# Patient Record
Sex: FEMALE | Race: WHITE | NOT HISPANIC OR LATINO
[De-identification: names, ages, dates, MRNs, and addresses within clinical notes are randomized per-mention and may not be internally consistent; named-entity substitution may affect disease eponyms.]

---

## 2024-03-02 ENCOUNTER — TRANSCRIPTION ENCOUNTER (OUTPATIENT)
Age: 24
End: 2024-03-02

## 2024-03-02 ENCOUNTER — INPATIENT (INPATIENT)
Facility: HOSPITAL | Age: 24
LOS: 3 days | Discharge: HOME | DRG: 853 | End: 2024-03-06
Attending: SURGERY | Admitting: SURGERY
Payer: COMMERCIAL

## 2024-03-02 ENCOUNTER — RESULT REVIEW (OUTPATIENT)
Age: 24
End: 2024-03-02

## 2024-03-02 VITALS
WEIGHT: 136.91 LBS | SYSTOLIC BLOOD PRESSURE: 92 MMHG | TEMPERATURE: 99 F | HEIGHT: 70 IN | DIASTOLIC BLOOD PRESSURE: 61 MMHG | RESPIRATION RATE: 18 BRPM | HEART RATE: 94 BPM | OXYGEN SATURATION: 100 %

## 2024-03-02 DIAGNOSIS — K35.80 UNSPECIFIED ACUTE APPENDICITIS: ICD-10-CM

## 2024-03-02 LAB
ALBUMIN SERPL ELPH-MCNC: 4.9 G/DL — SIGNIFICANT CHANGE UP (ref 3.3–5)
ALP SERPL-CCNC: 89 U/L — SIGNIFICANT CHANGE UP (ref 40–120)
ALT FLD-CCNC: 18 U/L — SIGNIFICANT CHANGE UP (ref 10–45)
ANION GAP SERPL CALC-SCNC: 15 MMOL/L — SIGNIFICANT CHANGE UP (ref 5–17)
APTT BLD: 29.2 SEC — SIGNIFICANT CHANGE UP (ref 24.5–35.6)
AST SERPL-CCNC: 21 U/L — SIGNIFICANT CHANGE UP (ref 10–40)
BASE EXCESS BLDV CALC-SCNC: 0.3 MMOL/L — SIGNIFICANT CHANGE UP (ref -2–3)
BASE EXCESS BLDV CALC-SCNC: 0.4 MMOL/L — SIGNIFICANT CHANGE UP (ref -2–3)
BASOPHILS # BLD AUTO: 0.04 K/UL — SIGNIFICANT CHANGE UP (ref 0–0.2)
BASOPHILS NFR BLD AUTO: 0.2 % — SIGNIFICANT CHANGE UP (ref 0–2)
BILIRUB SERPL-MCNC: 0.9 MG/DL — SIGNIFICANT CHANGE UP (ref 0.2–1.2)
BUN SERPL-MCNC: 11 MG/DL — SIGNIFICANT CHANGE UP (ref 7–23)
CA-I SERPL-SCNC: 1.23 MMOL/L — SIGNIFICANT CHANGE UP (ref 1.15–1.33)
CA-I SERPL-SCNC: 1.24 MMOL/L — SIGNIFICANT CHANGE UP (ref 1.15–1.33)
CALCIUM SERPL-MCNC: 10.5 MG/DL — SIGNIFICANT CHANGE UP (ref 8.4–10.5)
CHLORIDE BLDV-SCNC: 102 MMOL/L — SIGNIFICANT CHANGE UP (ref 96–108)
CHLORIDE BLDV-SCNC: 102 MMOL/L — SIGNIFICANT CHANGE UP (ref 96–108)
CHLORIDE SERPL-SCNC: 100 MMOL/L — SIGNIFICANT CHANGE UP (ref 96–108)
CO2 BLDV-SCNC: 23 MMOL/L — SIGNIFICANT CHANGE UP (ref 22–26)
CO2 BLDV-SCNC: 26 MMOL/L — SIGNIFICANT CHANGE UP (ref 22–26)
CO2 SERPL-SCNC: 22 MMOL/L — SIGNIFICANT CHANGE UP (ref 22–31)
CREAT SERPL-MCNC: 0.55 MG/DL — SIGNIFICANT CHANGE UP (ref 0.5–1.3)
EGFR: 132 ML/MIN/1.73M2 — SIGNIFICANT CHANGE UP
EOSINOPHIL # BLD AUTO: 0 K/UL — SIGNIFICANT CHANGE UP (ref 0–0.5)
EOSINOPHIL NFR BLD AUTO: 0 % — SIGNIFICANT CHANGE UP (ref 0–6)
GAS PNL BLDV: 133 MMOL/L — LOW (ref 136–145)
GAS PNL BLDV: 134 MMOL/L — LOW (ref 136–145)
GAS PNL BLDV: SIGNIFICANT CHANGE UP
GLUCOSE BLDV-MCNC: 111 MG/DL — HIGH (ref 70–99)
GLUCOSE BLDV-MCNC: 129 MG/DL — HIGH (ref 70–99)
GLUCOSE SERPL-MCNC: 136 MG/DL — HIGH (ref 70–99)
HCG SERPL-ACNC: <2 MIU/ML — SIGNIFICANT CHANGE UP
HCO3 BLDV-SCNC: 22 MMOL/L — SIGNIFICANT CHANGE UP (ref 22–29)
HCO3 BLDV-SCNC: 25 MMOL/L — SIGNIFICANT CHANGE UP (ref 22–29)
HCT VFR BLD CALC: 36.9 % — SIGNIFICANT CHANGE UP (ref 34.5–45)
HCT VFR BLDA CALC: 33 % — LOW (ref 34.5–46.5)
HCT VFR BLDA CALC: 38 % — SIGNIFICANT CHANGE UP (ref 34.5–46.5)
HGB BLD CALC-MCNC: 11 G/DL — LOW (ref 11.7–16.1)
HGB BLD CALC-MCNC: 12.6 G/DL — SIGNIFICANT CHANGE UP (ref 11.7–16.1)
HGB BLD-MCNC: 12.4 G/DL — SIGNIFICANT CHANGE UP (ref 11.5–15.5)
HOROWITZ INDEX BLDV+IHG-RTO: SIGNIFICANT CHANGE UP
IMM GRANULOCYTES NFR BLD AUTO: 0.3 % — SIGNIFICANT CHANGE UP (ref 0–0.9)
INR BLD: 1.24 RATIO — HIGH (ref 0.85–1.18)
LACTATE BLDV-MCNC: 1.2 MMOL/L — SIGNIFICANT CHANGE UP (ref 0.5–2)
LACTATE BLDV-MCNC: 2.5 MMOL/L — HIGH (ref 0.5–2)
LIDOCAIN IGE QN: 14 U/L — SIGNIFICANT CHANGE UP (ref 7–60)
LYMPHOCYTES # BLD AUTO: 1.5 K/UL — SIGNIFICANT CHANGE UP (ref 1–3.3)
LYMPHOCYTES # BLD AUTO: 7 % — LOW (ref 13–44)
MCHC RBC-ENTMCNC: 28.4 PG — SIGNIFICANT CHANGE UP (ref 27–34)
MCHC RBC-ENTMCNC: 33.6 GM/DL — SIGNIFICANT CHANGE UP (ref 32–36)
MCV RBC AUTO: 84.6 FL — SIGNIFICANT CHANGE UP (ref 80–100)
MONOCYTES # BLD AUTO: 1.43 K/UL — HIGH (ref 0–0.9)
MONOCYTES NFR BLD AUTO: 6.7 % — SIGNIFICANT CHANGE UP (ref 2–14)
NEUTROPHILS # BLD AUTO: 18.34 K/UL — HIGH (ref 1.8–7.4)
NEUTROPHILS NFR BLD AUTO: 85.8 % — HIGH (ref 43–77)
NRBC # BLD: 0 /100 WBCS — SIGNIFICANT CHANGE UP (ref 0–0)
PCO2 BLDV: 28 MMHG — LOW (ref 39–42)
PCO2 BLDV: 37 MMHG — LOW (ref 39–42)
PH BLDV: 7.43 — SIGNIFICANT CHANGE UP (ref 7.32–7.43)
PH BLDV: 7.51 — HIGH (ref 7.32–7.43)
PLATELET # BLD AUTO: 308 K/UL — SIGNIFICANT CHANGE UP (ref 150–400)
PO2 BLDV: 40 MMHG — SIGNIFICANT CHANGE UP (ref 25–45)
PO2 BLDV: 73 MMHG — HIGH (ref 25–45)
POTASSIUM BLDV-SCNC: 3.5 MMOL/L — SIGNIFICANT CHANGE UP (ref 3.5–5.1)
POTASSIUM BLDV-SCNC: 3.7 MMOL/L — SIGNIFICANT CHANGE UP (ref 3.5–5.1)
POTASSIUM SERPL-MCNC: 3.9 MMOL/L — SIGNIFICANT CHANGE UP (ref 3.5–5.3)
POTASSIUM SERPL-SCNC: 3.9 MMOL/L — SIGNIFICANT CHANGE UP (ref 3.5–5.3)
PROT SERPL-MCNC: 7.9 G/DL — SIGNIFICANT CHANGE UP (ref 6–8.3)
PROTHROM AB SERPL-ACNC: 12.9 SEC — SIGNIFICANT CHANGE UP (ref 9.5–13)
RBC # BLD: 4.36 M/UL — SIGNIFICANT CHANGE UP (ref 3.8–5.2)
RBC # FLD: 12.7 % — SIGNIFICANT CHANGE UP (ref 10.3–14.5)
SAO2 % BLDV: 78.9 % — SIGNIFICANT CHANGE UP (ref 67–88)
SAO2 % BLDV: 96.6 % — HIGH (ref 67–88)
SODIUM SERPL-SCNC: 137 MMOL/L — SIGNIFICANT CHANGE UP (ref 135–145)
WBC # BLD: 21.38 K/UL — HIGH (ref 3.8–10.5)
WBC # FLD AUTO: 21.38 K/UL — HIGH (ref 3.8–10.5)

## 2024-03-02 PROCEDURE — 99285 EMERGENCY DEPT VISIT HI MDM: CPT

## 2024-03-02 PROCEDURE — 88304 TISSUE EXAM BY PATHOLOGIST: CPT | Mod: 26

## 2024-03-02 PROCEDURE — 74177 CT ABD & PELVIS W/CONTRAST: CPT | Mod: 26,MC

## 2024-03-02 RX ORDER — PIPERACILLIN AND TAZOBACTAM 4; .5 G/20ML; G/20ML
3.38 INJECTION, POWDER, LYOPHILIZED, FOR SOLUTION INTRAVENOUS EVERY 8 HOURS
Refills: 0 | Status: DISCONTINUED | OUTPATIENT
Start: 2024-03-03 | End: 2024-03-03

## 2024-03-02 RX ORDER — ONDANSETRON 8 MG/1
4 TABLET, FILM COATED ORAL ONCE
Refills: 0 | Status: COMPLETED | OUTPATIENT
Start: 2024-03-02 | End: 2024-03-02

## 2024-03-02 RX ORDER — ACETAMINOPHEN 500 MG
1000 TABLET ORAL EVERY 6 HOURS
Refills: 0 | Status: DISCONTINUED | OUTPATIENT
Start: 2024-03-02 | End: 2024-03-03

## 2024-03-02 RX ORDER — HYDROMORPHONE HYDROCHLORIDE 2 MG/ML
0.5 INJECTION INTRAMUSCULAR; INTRAVENOUS; SUBCUTANEOUS EVERY 4 HOURS
Refills: 0 | Status: DISCONTINUED | OUTPATIENT
Start: 2024-03-02 | End: 2024-03-03

## 2024-03-02 RX ORDER — SODIUM CHLORIDE 9 MG/ML
1000 INJECTION, SOLUTION INTRAVENOUS ONCE
Refills: 0 | Status: COMPLETED | OUTPATIENT
Start: 2024-03-02 | End: 2024-03-02

## 2024-03-02 RX ORDER — PIPERACILLIN AND TAZOBACTAM 4; .5 G/20ML; G/20ML
3.38 INJECTION, POWDER, LYOPHILIZED, FOR SOLUTION INTRAVENOUS ONCE
Refills: 0 | Status: COMPLETED | OUTPATIENT
Start: 2024-03-02 | End: 2024-03-02

## 2024-03-02 RX ORDER — ENOXAPARIN SODIUM 100 MG/ML
40 INJECTION SUBCUTANEOUS EVERY 24 HOURS
Refills: 0 | Status: DISCONTINUED | OUTPATIENT
Start: 2024-03-03 | End: 2024-03-03

## 2024-03-02 RX ORDER — SODIUM CHLORIDE 9 MG/ML
1000 INJECTION, SOLUTION INTRAVENOUS
Refills: 0 | Status: DISCONTINUED | OUTPATIENT
Start: 2024-03-02 | End: 2024-03-03

## 2024-03-02 RX ORDER — ACETAMINOPHEN 500 MG
1000 TABLET ORAL ONCE
Refills: 0 | Status: COMPLETED | OUTPATIENT
Start: 2024-03-02 | End: 2024-03-02

## 2024-03-02 RX ADMIN — SODIUM CHLORIDE 1000 MILLILITER(S): 9 INJECTION, SOLUTION INTRAVENOUS at 21:55

## 2024-03-02 RX ADMIN — ONDANSETRON 4 MILLIGRAM(S): 8 TABLET, FILM COATED ORAL at 21:32

## 2024-03-02 RX ADMIN — Medication 400 MILLIGRAM(S): at 21:31

## 2024-03-02 RX ADMIN — PIPERACILLIN AND TAZOBACTAM 200 GRAM(S): 4; .5 INJECTION, POWDER, LYOPHILIZED, FOR SOLUTION INTRAVENOUS at 21:54

## 2024-03-02 RX ADMIN — SODIUM CHLORIDE 1000 MILLILITER(S): 9 INJECTION, SOLUTION INTRAVENOUS at 22:32

## 2024-03-02 NOTE — H&P ADULT - ATTENDING COMMENTS
Pt with acute abd pain and CT c/w perforated appendicitis with multiple appendicoliths. I recommended emergency lap appy, poss open and discussed details of the procedure, R/B/A and expectations of recovery with pt and mother.

## 2024-03-02 NOTE — ED ADULT NURSE NOTE - NS ED NOTE  TALK SOMEONE YN
"Group Therapy Documentation    PATIENT'S NAME: Madhav Oropeza  MRN:   [id number]  :   2005  ACCT. NUMBER: [id number]  DATE OF SERVICE: 23  START TIME:  8:30 AM  END TIME:  9:00 AM  FACILITATOR(S): Hailey Fields LADC  TOPIC: BEH Group Therapy  Number of patients attending the group:  11  Group Length:  0.5 Hours    Dimensions addressed 3, 4, 5, and 6    Summary of Group / Topics Discussed:    Group Therapy/Process Group:  Community Group  Patient completed diary card ratings for the last 24 hours including emotions, safety concerns, substance use, treatment interfering behaviors, and use of DBT skills.  Patient checked in regarding the previous evening as well as progress on treatment goals.    Patient Session Goals / Objectives:  * Patient will increase awareness of emotions and ability to identify them  * Patient will report substance use and safety concerns   * Patient will increase use of DBT skills      Group Attendance:  Attended group session  Interactive Complexity: No    Patient's response to the group topic/interactions:  cooperative with task    Patient appeared to be Actively participating.       Client specific details:  Client checked in as feeling \"angry and content\". Client reported using DBT skills \"self sooth and cope ahead\". Client asked for time to process and stated their treatment goal is to stage up. Client denied urges to use. Diary Card Ratings:  Self-harm thoughts: 1  Action:  No.  Suicide ideation: 0 Action:  No.         " No

## 2024-03-02 NOTE — ED PROVIDER NOTE - CADM POA CENTRAL LINE
-- DO NOT REPLY / DO NOT REPLY ALL --  -- Message is from Engagement Center Operations (ECO) --    General Patient Message   Doctor  ?Emanuel Tucker MD, Sony  Nurse Chloé   MRN [id number]  Tera   Returning call from a call at 3:59 yesterday     Caller Information       Type Contact Phone/Fax    11/22/2022 09:05 AM CST Phone (Incoming) Tera Almanza (Self) [phone] (M)        Alternative phone number: none     Can a detailed message be left? {Yes=1 or No:[id number]::\"No\"}    Message Turnaround: {Is the call about an [place], [facility] or [facility] practice site?:[id number]}              
No

## 2024-03-02 NOTE — ED PROVIDER NOTE - PHYSICAL EXAMINATION
Darrion Da Silva DO (PGY2)   Physical Exam:    Gen: NAD, AOx3  Head: NCAT  HEENT: EOMI, PEERLA  Lung: CTAB, no respiratory distress, no wheezes/rhonchi/rales B/L  CV: RRR, no murmurs, rubs or gallops  Abd: Right lower quadrant tenderness to palpation, no guarding no rigidity or rebound tenderness.  No CVA tenderness bilaterally  MSK: no visible deformities, ROM normal in UE/LE, no back pain  Neuro: No focal sensory or motor deficits. Sensation intact to light touch all extremities.  Skin: Warm, well perfused, no rash, no leg swelling

## 2024-03-02 NOTE — ED PROVIDER NOTE - PROGRESS NOTE DETAILS
Darrion Da Silva DO (PGY2)  Spoke with Dr. Sin - who called ab patient. Discussed prelim read of CT scan of concerns for appendicitis with perforation. Reached out to patient PMD Dr. Bosch at number he provided with no response - left a message. Awaiting final read and surgery re-eval. Darrion Da Silva DO (PGY2)  Spoke with Dr. Sin - who called ab patient. Discussed prelim read of CT scan of concerns for appendicitis with perforation. Reached out to patient PMD Dr. Bosch at number he provided with no response - left a message. Patient and mother made aware of preliminary CT read - Awaiting final read and surgery re-eval. Darrion Da Silva DO (PGY2)  surg resident at bedside. spoke with radiology team regarding CT showing appendicitis. patient to be admitted to surgery

## 2024-03-02 NOTE — H&P ADULT - NSHPPHYSICALEXAM_GEN_ALL_CORE
Physical Examination:  GEN: NAD, resting quietly  NEURO: AAOx3, CN II-XII grossly intact, no focal deficits  PULM: symmetric chest rise bilaterally, no increased WOB  ABD: soft, tender to palpation in the RLQ with focal rebound, no guarding, referred pain from suprapubic to RLQ (+Rovsing), nondistended  EXTR: no lower extremity edema, moving all extremities

## 2024-03-02 NOTE — H&P ADULT - NSHPLABSRESULTS_GEN_ALL_CORE
----------  LABORATORY DATA:  ----------                        12.4   21.38 )-----------( 308      ( 02 Mar 2024 21:34 )             36.9               03-02    137  |  100  |  11  ----------------------------<  136<H>  3.9   |  22  |  0.55    Ca    10.5      02 Mar 2024 21:34  Phos  2.8     03-02  Mg     2.1     03-02    TPro  7.9  /  Alb  4.9  /  TBili  0.9  /  DBili  x   /  AST  21  /  ALT  18  /  AlkPhos  89  03-02    LIVER FUNCTIONS - ( 02 Mar 2024 21:34 )  Alb: 4.9 g/dL / Pro: 7.9 g/dL / ALK PHOS: 89 U/L / ALT: 18 U/L / AST: 21 U/L / GGT: x           PT/INR - ( 02 Mar 2024 21:34 )   PT: 12.9 sec;   INR: 1.24 ratio         PTT - ( 02 Mar 2024 21:34 )  PTT:29.2 sec  Urinalysis Basic - ( 02 Mar 2024 21:34 )    Color: x / Appearance: x / SG: x / pH: x  Gluc: 136 mg/dL / Ketone: x  / Bili: x / Urobili: x   Blood: x / Protein: x / Nitrite: x   Leuk Esterase: x / RBC: x / WBC x   Sq Epi: x / Non Sq Epi: x / Bacteria: x    ----------  RADIOGRAPHIC DATA:  ---------  < from: CT Abdomen and Pelvis w/ IV Cont (03.02.24 @ 22:08) >      FINDINGS:  LOWER CHEST: Within normal limits.    LIVER: Focal fatty infiltration adjacent to the falciform ligament.  BILE DUCTS: Normal caliber.  GALLBLADDER: Within normal limits.  SPLEEN: Within normal limits.  PANCREAS: Within normal limits.  ADRENALS: Within normal limits.  KIDNEYS/URETERS: Within normal limits.    BLADDER: Within normal limits.  REPRODUCTIVE ORGANS: Uterus and adnexa within normal limits.    BOWEL: No bowel obstruction. The appendix is retrocecal in location and   courses cephalad towards the right kidney, with marked surrounding   inflammation, fluid and thickening of the right peritoneal reflections.   The appendix is dilated up to 1.5 cm with surrounding wall enhancement.    Areas of absent enhancement anteriorly, walldefect seen on series 301:63   and 602:51, suggests gangrene and perforation.  There are prominent   appendicoliths proximally, measuring 1.3 cm, 0.6 cm, and 0.5 cm.    PERITONEUM: Right lower quadrant ascites as above  VESSELS: Within normal limits.  RETROPERITONEUM/LYMPH NODES: No lymphadenopathy.  ABDOMINAL WALL: Within normal limits.  BONES: Lumbar levoscoliosis.    IMPRESSION:    Acute appendicitis, most likely complicated by perforation and foci of   gangrene. Multiple appendicoliths in theproximal appendix measure up to   1.3 cm. Marked right sided inflammation and peritoneal hyperenhancement   and thickening suggests regional peritonitis and adhesions. No   significant cecal base thickening.    Findings discussed with Dr. Da Silva at 11:10 pm on 3/2/24 with RBV.    --- End of Report ---    < end of copied text >

## 2024-03-02 NOTE — H&P ADULT - ASSESSMENT
Ms. Ley is a 23 year old woman with a PMH of multiple R hip orthopedic surgeries who presents with 1 day of RLQ pain, nausea, and emesis, found to have leukocytosis and imaging consistent with acute perforated appendicitis.     Plan:  - admit to Dr. Ruff  - NPO/IVf  - OR for emergent appendectomy  - consented  - continue zosyn  - holding home Vyvanse, Lexapro whilst NPO  -DVT ppx in AM    Aaron Schmid, PGY3  [place] [facility]   [id number]

## 2024-03-02 NOTE — ED PROVIDER NOTE - CLINICAL SUMMARY MEDICAL DECISION MAKING FREE TEXT BOX
23-year-old female history of anxiety and ADHD on Lexapro and Vyvanse presenting with right lower quadrant abdominal pain.  Patient states she has been vomiting nonbloody in nature since last night and began to have constant right lower quadrant abdominal pain.  Denies any urinary complaint such as dysuria or hematuria.  Denies any vaginal discharge.  States she is currently on her menstrual period.   Vital signs remarkable for temperature of 99.3, not hypoxic, not tachycardic.  Right lower quadrant tenderness on exam.  Will pursue basic labs, VBG with lactate, hCG, coags, presurgical labs, symptomatic control with Zofran and fluids  Diagnosis includes but not limited to appendicitis versus viral gastroenteritis versus infectious etiology.

## 2024-03-02 NOTE — ED PROVIDER NOTE - OBJECTIVE STATEMENT
23-year-old female history of anxiety and ADHD on Lexapro and Vyvanse presenting with right lower quadrant abdominal pain.  Patient states she has been vomiting nonbloody in nature since last night and began to have constant right lower quadrant abdominal pain.  Denies any urinary complaint such as dysuria or hematuria.  Denies any vaginal discharge.  States she is currently on her menstrual period.  States subjective chills without fever, no URI symptoms, chest pain, shortness of breath. Accompanied by mother at bedside.  States she is not sexually active.  No illicit drug use.

## 2024-03-02 NOTE — ED ADULT NURSE NOTE - OBJECTIVE STATEMENT
23y female presents to ED w/ abdominal pain. Pt states she had an episode of vomiting yesterday and shortly after developed right lower abdominal pain. Pt states the pain has been constant since its onset and was sent by MD to ED to rule out appendicitis. Pt abdominal is soft non distended. Tender on palpation to RLQ. Pt denies fever, chills, diarrhea, urinary symptoms, dizziness, lightheadedness, vaginal bleeding. Pt ambulatory.

## 2024-03-02 NOTE — H&P ADULT - HISTORY OF PRESENT ILLNESS
Ms. Ley is a 23 year old woman with a PMH of multiple R hip orthopedic surgeries who presents with 1 day of RLQ pain, nausea, and emesis. She reports that last night she became nauseated and numerous episodes of nonbloody nonbilious emesis throughout the night into today. She also noted RLQ pain that was unrelenting but could not necessarily describe if it was crampy or sharp. She did note some subjective chills wihtout measuring fever. She did have her regular period starting around 2 days ago, associated with normal amount of flow. She is regular and typically lasts about 6 days. she denies being sexually active in the past few weeks and denies any history of STI or recent infections of any kind. In the ED the patient is afebrile, mildly hypotensive, and normocardic. Labs were significant for a leukocytosis to 21 and CT demonstrates acute perforated appendicitis.

## 2024-03-02 NOTE — ED PROVIDER NOTE - ATTENDING CONTRIBUTION TO CARE
Attending MD Brar: I personally have seen and examined this patient.  Resident note reviewed and agree on plan of care and except where noted.  See below for details.     seen in [facility], accompanied by mother    23F with PMH/PSH including anxiety on Lexapro, ADHD on Vyvanse, s/p multiple R hip surgeries presents to the ED with nausea, vomiting, abdominal pain.  Reports that began having nonbloody, nonbilious emesis last night.  Reports RLQ abdominal pain, worse today.  Reports had a doctor go to her home and was sent in for concern for appendicitis.  Denies bloody or black stools.  Reports having BM.  Reports is currently menstruating.  Denies vaginal discharge.  Denies sexually active, denies tobacco, drugs, EtOH.  Denies chest pain, shortness of breath.  Reports chills.      Exam:   General: NAD  HENT: head NCAT, airway patent  Eyes: anicteric, no conjunctival injection   Lungs: lungs CTAB with good inspiratory effort, no wheezing, no rhonchi, no rales  Cardiac: +S1S2, no obvious m/r/g  GI: abdomen soft with +BS, +tenderness to palpation in RLQ, +McBurneys, neg Rovsings, no rebound, no guarding, ND  : no CVAT  MSK: ranging neck and extremities freely  Neuro: moving all extremities spontaneously, nonfocal  Psych: very anxious    A/P: 23F with RLQ abdominal pain, nausea, vomiting, DDx includes appendicitis, cecitis, will also consider ovarian pathology such as ovarian cyst rupture, will obtain CTAP, will obtain labs for metabolic derangement, will give IVFs, will keep npo, will give IV abx as needed, will consult sx as needed.  Patient and mother amenable to plan.

## 2024-03-03 LAB
ANION GAP SERPL CALC-SCNC: 10 MMOL/L — SIGNIFICANT CHANGE UP (ref 5–17)
ANION GAP SERPL CALC-SCNC: 11 MMOL/L — SIGNIFICANT CHANGE UP (ref 5–17)
BASOPHILS # BLD AUTO: 0.02 K/UL — SIGNIFICANT CHANGE UP (ref 0–0.2)
BASOPHILS NFR BLD AUTO: 0.1 % — SIGNIFICANT CHANGE UP (ref 0–2)
BUN SERPL-MCNC: 7 MG/DL — SIGNIFICANT CHANGE UP (ref 7–23)
BUN SERPL-MCNC: 9 MG/DL — SIGNIFICANT CHANGE UP (ref 7–23)
CALCIUM SERPL-MCNC: 8.2 MG/DL — LOW (ref 8.4–10.5)
CALCIUM SERPL-MCNC: 8.8 MG/DL — SIGNIFICANT CHANGE UP (ref 8.4–10.5)
CHLORIDE SERPL-SCNC: 102 MMOL/L — SIGNIFICANT CHANGE UP (ref 96–108)
CHLORIDE SERPL-SCNC: 103 MMOL/L — SIGNIFICANT CHANGE UP (ref 96–108)
CO2 SERPL-SCNC: 24 MMOL/L — SIGNIFICANT CHANGE UP (ref 22–31)
CO2 SERPL-SCNC: 25 MMOL/L — SIGNIFICANT CHANGE UP (ref 22–31)
CREAT SERPL-MCNC: 0.49 MG/DL — LOW (ref 0.5–1.3)
CREAT SERPL-MCNC: 0.57 MG/DL — SIGNIFICANT CHANGE UP (ref 0.5–1.3)
EGFR: 131 ML/MIN/1.73M2 — SIGNIFICANT CHANGE UP
EGFR: 136 ML/MIN/1.73M2 — SIGNIFICANT CHANGE UP
EOSINOPHIL # BLD AUTO: 0 K/UL — SIGNIFICANT CHANGE UP (ref 0–0.5)
EOSINOPHIL NFR BLD AUTO: 0 % — SIGNIFICANT CHANGE UP (ref 0–6)
GLUCOSE SERPL-MCNC: 149 MG/DL — HIGH (ref 70–99)
GLUCOSE SERPL-MCNC: 159 MG/DL — HIGH (ref 70–99)
HCT VFR BLD CALC: 30.6 % — LOW (ref 34.5–45)
HCT VFR BLD CALC: 31.6 % — LOW (ref 34.5–45)
HGB BLD-MCNC: 10.1 G/DL — LOW (ref 11.5–15.5)
HGB BLD-MCNC: 10.3 G/DL — LOW (ref 11.5–15.5)
IMM GRANULOCYTES NFR BLD AUTO: 0.2 % — SIGNIFICANT CHANGE UP (ref 0–0.9)
LYMPHOCYTES # BLD AUTO: 0.52 K/UL — LOW (ref 1–3.3)
LYMPHOCYTES # BLD AUTO: 3.9 % — LOW (ref 13–44)
MAGNESIUM SERPL-MCNC: 1.9 MG/DL — SIGNIFICANT CHANGE UP (ref 1.6–2.6)
MAGNESIUM SERPL-MCNC: 1.9 MG/DL — SIGNIFICANT CHANGE UP (ref 1.6–2.6)
MCHC RBC-ENTMCNC: 28.3 PG — SIGNIFICANT CHANGE UP (ref 27–34)
MCHC RBC-ENTMCNC: 28.9 PG — SIGNIFICANT CHANGE UP (ref 27–34)
MCHC RBC-ENTMCNC: 32 GM/DL — SIGNIFICANT CHANGE UP (ref 32–36)
MCHC RBC-ENTMCNC: 33.7 GM/DL — SIGNIFICANT CHANGE UP (ref 32–36)
MCV RBC AUTO: 85.7 FL — SIGNIFICANT CHANGE UP (ref 80–100)
MCV RBC AUTO: 88.5 FL — SIGNIFICANT CHANGE UP (ref 80–100)
MONOCYTES # BLD AUTO: 0.92 K/UL — HIGH (ref 0–0.9)
MONOCYTES NFR BLD AUTO: 6.8 % — SIGNIFICANT CHANGE UP (ref 2–14)
NEUTROPHILS # BLD AUTO: 12 K/UL — HIGH (ref 1.8–7.4)
NEUTROPHILS NFR BLD AUTO: 89 % — HIGH (ref 43–77)
NRBC # BLD: 0 /100 WBCS — SIGNIFICANT CHANGE UP (ref 0–0)
NRBC # BLD: 0 /100 WBCS — SIGNIFICANT CHANGE UP (ref 0–0)
PHOSPHATE SERPL-MCNC: 3.2 MG/DL — SIGNIFICANT CHANGE UP (ref 2.5–4.5)
PHOSPHATE SERPL-MCNC: 3.7 MG/DL — SIGNIFICANT CHANGE UP (ref 2.5–4.5)
PLATELET # BLD AUTO: 230 K/UL — SIGNIFICANT CHANGE UP (ref 150–400)
PLATELET # BLD AUTO: 232 K/UL — SIGNIFICANT CHANGE UP (ref 150–400)
POTASSIUM SERPL-MCNC: 3.8 MMOL/L — SIGNIFICANT CHANGE UP (ref 3.5–5.3)
POTASSIUM SERPL-MCNC: 4 MMOL/L — SIGNIFICANT CHANGE UP (ref 3.5–5.3)
POTASSIUM SERPL-SCNC: 3.8 MMOL/L — SIGNIFICANT CHANGE UP (ref 3.5–5.3)
POTASSIUM SERPL-SCNC: 4 MMOL/L — SIGNIFICANT CHANGE UP (ref 3.5–5.3)
RBC # BLD: 3.57 M/UL — LOW (ref 3.8–5.2)
RBC # BLD: 3.57 M/UL — LOW (ref 3.8–5.2)
RBC # FLD: 12.8 % — SIGNIFICANT CHANGE UP (ref 10.3–14.5)
RBC # FLD: 13.1 % — SIGNIFICANT CHANGE UP (ref 10.3–14.5)
SODIUM SERPL-SCNC: 137 MMOL/L — SIGNIFICANT CHANGE UP (ref 135–145)
SODIUM SERPL-SCNC: 138 MMOL/L — SIGNIFICANT CHANGE UP (ref 135–145)
WBC # BLD: 12.74 K/UL — HIGH (ref 3.8–10.5)
WBC # BLD: 13.49 K/UL — HIGH (ref 3.8–10.5)
WBC # FLD AUTO: 12.74 K/UL — HIGH (ref 3.8–10.5)
WBC # FLD AUTO: 13.49 K/UL — HIGH (ref 3.8–10.5)

## 2024-03-03 PROCEDURE — 99223 1ST HOSP IP/OBS HIGH 75: CPT | Mod: 57

## 2024-03-03 PROCEDURE — 44970 LAPAROSCOPY APPENDECTOMY: CPT

## 2024-03-03 PROCEDURE — 93010 ELECTROCARDIOGRAM REPORT: CPT

## 2024-03-03 DEVICE — STAPLER COVIDIEN TRI-STAPLE 45MM TAN RELOAD: Type: IMPLANTABLE DEVICE | Status: FUNCTIONAL

## 2024-03-03 DEVICE — CLIP APPLIER COVIDIEN ENDOCLIP II 10MM MED/LG: Type: IMPLANTABLE DEVICE | Status: FUNCTIONAL

## 2024-03-03 DEVICE — STAPLER COVIDIEN TRI-STAPLE 60MM PURPLE RELOAD: Type: IMPLANTABLE DEVICE | Status: FUNCTIONAL

## 2024-03-03 DEVICE — VISTASEAL FIBRIN HUMAN 4ML: Type: IMPLANTABLE DEVICE | Status: FUNCTIONAL

## 2024-03-03 RX ORDER — HYDROMORPHONE HYDROCHLORIDE 2 MG/ML
0.5 INJECTION INTRAMUSCULAR; INTRAVENOUS; SUBCUTANEOUS EVERY 4 HOURS
Refills: 0 | Status: DISCONTINUED | OUTPATIENT
Start: 2024-03-03 | End: 2024-03-04

## 2024-03-03 RX ORDER — LANOLIN ALCOHOL/MO/W.PET/CERES
3 CREAM (GRAM) TOPICAL AT BEDTIME
Refills: 0 | Status: DISCONTINUED | OUTPATIENT
Start: 2024-03-03 | End: 2024-03-03

## 2024-03-03 RX ORDER — ONDANSETRON 8 MG/1
4 TABLET, FILM COATED ORAL ONCE
Refills: 0 | Status: DISCONTINUED | OUTPATIENT
Start: 2024-03-03 | End: 2024-03-03

## 2024-03-03 RX ORDER — HYDROMORPHONE HYDROCHLORIDE 2 MG/ML
0.5 INJECTION INTRAMUSCULAR; INTRAVENOUS; SUBCUTANEOUS
Refills: 0 | Status: DISCONTINUED | OUTPATIENT
Start: 2024-03-03 | End: 2024-03-03

## 2024-03-03 RX ORDER — INFLUENZA VIRUS VACCINE 15; 15; 15; 15 UG/.5ML; UG/.5ML; UG/.5ML; UG/.5ML
0.5 SUSPENSION INTRAMUSCULAR ONCE
Refills: 0 | Status: DISCONTINUED | OUTPATIENT
Start: 2024-03-03 | End: 2024-03-06

## 2024-03-03 RX ORDER — HYDROMORPHONE HYDROCHLORIDE 2 MG/ML
0.25 INJECTION INTRAMUSCULAR; INTRAVENOUS; SUBCUTANEOUS ONCE
Refills: 0 | Status: DISCONTINUED | OUTPATIENT
Start: 2024-03-03 | End: 2024-03-03

## 2024-03-03 RX ORDER — SODIUM CHLORIDE 9 MG/ML
1000 INJECTION, SOLUTION INTRAVENOUS
Refills: 0 | Status: DISCONTINUED | OUTPATIENT
Start: 2024-03-03 | End: 2024-03-05

## 2024-03-03 RX ORDER — SODIUM CHLORIDE 9 MG/ML
1000 INJECTION, SOLUTION INTRAVENOUS
Refills: 0 | Status: DISCONTINUED | OUTPATIENT
Start: 2024-03-03 | End: 2024-03-03

## 2024-03-03 RX ORDER — ESCITALOPRAM OXALATE 10 MG/1
40 TABLET, FILM COATED ORAL DAILY
Refills: 0 | Status: DISCONTINUED | OUTPATIENT
Start: 2024-03-03 | End: 2024-03-06

## 2024-03-03 RX ORDER — SODIUM CHLORIDE 9 MG/ML
500 INJECTION, SOLUTION INTRAVENOUS ONCE
Refills: 0 | Status: COMPLETED | OUTPATIENT
Start: 2024-03-03 | End: 2024-03-03

## 2024-03-03 RX ORDER — KETOROLAC TROMETHAMINE 30 MG/ML
15 SYRINGE (ML) INJECTION EVERY 8 HOURS
Refills: 0 | Status: DISCONTINUED | OUTPATIENT
Start: 2024-03-03 | End: 2024-03-06

## 2024-03-03 RX ORDER — KETOROLAC TROMETHAMINE 30 MG/ML
30 SYRINGE (ML) INJECTION EVERY 6 HOURS
Refills: 0 | Status: DISCONTINUED | OUTPATIENT
Start: 2024-03-03 | End: 2024-03-03

## 2024-03-03 RX ORDER — LANOLIN ALCOHOL/MO/W.PET/CERES
5 CREAM (GRAM) TOPICAL AT BEDTIME
Refills: 0 | Status: DISCONTINUED | OUTPATIENT
Start: 2024-03-03 | End: 2024-03-06

## 2024-03-03 RX ORDER — ACETAMINOPHEN 500 MG
1000 TABLET ORAL EVERY 6 HOURS
Refills: 0 | Status: DISCONTINUED | OUTPATIENT
Start: 2024-03-03 | End: 2024-03-06

## 2024-03-03 RX ORDER — ACETAMINOPHEN 500 MG
1000 TABLET ORAL EVERY 6 HOURS
Refills: 0 | Status: DISCONTINUED | OUTPATIENT
Start: 2024-03-03 | End: 2024-03-03

## 2024-03-03 RX ORDER — LISDEXAMFETAMINE DIMESYLATE 70 MG/1
60 CAPSULE ORAL
Refills: 0 | Status: DISCONTINUED | OUTPATIENT
Start: 2024-03-03 | End: 2024-03-06

## 2024-03-03 RX ORDER — PIPERACILLIN AND TAZOBACTAM 4; .5 G/20ML; G/20ML
3.38 INJECTION, POWDER, LYOPHILIZED, FOR SOLUTION INTRAVENOUS EVERY 8 HOURS
Refills: 0 | Status: DISCONTINUED | OUTPATIENT
Start: 2024-03-03 | End: 2024-03-06

## 2024-03-03 RX ORDER — ENOXAPARIN SODIUM 100 MG/ML
40 INJECTION SUBCUTANEOUS EVERY 24 HOURS
Refills: 0 | Status: DISCONTINUED | OUTPATIENT
Start: 2024-03-03 | End: 2024-03-06

## 2024-03-03 RX ORDER — HYDROMORPHONE HYDROCHLORIDE 2 MG/ML
1 INJECTION INTRAMUSCULAR; INTRAVENOUS; SUBCUTANEOUS
Refills: 0 | Status: DISCONTINUED | OUTPATIENT
Start: 2024-03-03 | End: 2024-03-03

## 2024-03-03 RX ADMIN — Medication 1000 MILLIGRAM(S): at 10:00

## 2024-03-03 RX ADMIN — HYDROMORPHONE HYDROCHLORIDE 0.5 MILLIGRAM(S): 2 INJECTION INTRAMUSCULAR; INTRAVENOUS; SUBCUTANEOUS at 11:45

## 2024-03-03 RX ADMIN — Medication 400 MILLIGRAM(S): at 03:50

## 2024-03-03 RX ADMIN — Medication 30 MILLIGRAM(S): at 10:00

## 2024-03-03 RX ADMIN — HYDROMORPHONE HYDROCHLORIDE 0.5 MILLIGRAM(S): 2 INJECTION INTRAMUSCULAR; INTRAVENOUS; SUBCUTANEOUS at 15:56

## 2024-03-03 RX ADMIN — HYDROMORPHONE HYDROCHLORIDE 0.5 MILLIGRAM(S): 2 INJECTION INTRAMUSCULAR; INTRAVENOUS; SUBCUTANEOUS at 06:35

## 2024-03-03 RX ADMIN — Medication 15 MILLIGRAM(S): at 15:10

## 2024-03-03 RX ADMIN — PIPERACILLIN AND TAZOBACTAM 25 GRAM(S): 4; .5 INJECTION, POWDER, LYOPHILIZED, FOR SOLUTION INTRAVENOUS at 03:32

## 2024-03-03 RX ADMIN — Medication 30 MILLIGRAM(S): at 09:28

## 2024-03-03 RX ADMIN — HYDROMORPHONE HYDROCHLORIDE 0.5 MILLIGRAM(S): 2 INJECTION INTRAMUSCULAR; INTRAVENOUS; SUBCUTANEOUS at 23:34

## 2024-03-03 RX ADMIN — Medication 15 MILLIGRAM(S): at 22:02

## 2024-03-03 RX ADMIN — PIPERACILLIN AND TAZOBACTAM 25 GRAM(S): 4; .5 INJECTION, POWDER, LYOPHILIZED, FOR SOLUTION INTRAVENOUS at 09:28

## 2024-03-03 RX ADMIN — HYDROMORPHONE HYDROCHLORIDE 0.25 MILLIGRAM(S): 2 INJECTION INTRAMUSCULAR; INTRAVENOUS; SUBCUTANEOUS at 19:17

## 2024-03-03 RX ADMIN — ENOXAPARIN SODIUM 40 MILLIGRAM(S): 100 INJECTION SUBCUTANEOUS at 11:23

## 2024-03-03 RX ADMIN — HYDROMORPHONE HYDROCHLORIDE 0.5 MILLIGRAM(S): 2 INJECTION INTRAMUSCULAR; INTRAVENOUS; SUBCUTANEOUS at 23:04

## 2024-03-03 RX ADMIN — SODIUM CHLORIDE 75 MILLILITER(S): 9 INJECTION, SOLUTION INTRAVENOUS at 23:05

## 2024-03-03 RX ADMIN — Medication 1000 MILLIGRAM(S): at 04:15

## 2024-03-03 RX ADMIN — Medication 5 MILLIGRAM(S): at 23:04

## 2024-03-03 RX ADMIN — HYDROMORPHONE HYDROCHLORIDE 0.5 MILLIGRAM(S): 2 INJECTION INTRAMUSCULAR; INTRAVENOUS; SUBCUTANEOUS at 15:41

## 2024-03-03 RX ADMIN — Medication 400 MILLIGRAM(S): at 09:27

## 2024-03-03 RX ADMIN — HYDROMORPHONE HYDROCHLORIDE 0.5 MILLIGRAM(S): 2 INJECTION INTRAMUSCULAR; INTRAVENOUS; SUBCUTANEOUS at 06:05

## 2024-03-03 RX ADMIN — Medication 400 MILLIGRAM(S): at 15:11

## 2024-03-03 RX ADMIN — Medication 1000 MILLIGRAM(S): at 23:04

## 2024-03-03 RX ADMIN — PIPERACILLIN AND TAZOBACTAM 25 GRAM(S): 4; .5 INJECTION, POWDER, LYOPHILIZED, FOR SOLUTION INTRAVENOUS at 17:26

## 2024-03-03 RX ADMIN — Medication 15 MILLIGRAM(S): at 21:32

## 2024-03-03 RX ADMIN — SODIUM CHLORIDE 1000 MILLILITER(S): 9 INJECTION, SOLUTION INTRAVENOUS at 09:26

## 2024-03-03 RX ADMIN — SODIUM CHLORIDE 100 MILLILITER(S): 9 INJECTION, SOLUTION INTRAVENOUS at 03:33

## 2024-03-03 RX ADMIN — HYDROMORPHONE HYDROCHLORIDE 0.25 MILLIGRAM(S): 2 INJECTION INTRAMUSCULAR; INTRAVENOUS; SUBCUTANEOUS at 19:02

## 2024-03-03 RX ADMIN — HYDROMORPHONE HYDROCHLORIDE 0.5 MILLIGRAM(S): 2 INJECTION INTRAMUSCULAR; INTRAVENOUS; SUBCUTANEOUS at 11:20

## 2024-03-03 NOTE — CHART NOTE - NSCHARTNOTEFT_GEN_A_CORE
Post Operative Note  Patient: MARY NASH 23y (2000) Female   MRN: [id number]  Location: [facility] 9MON 919   Date: 03-03-24 @ 13:38    Procedure: Lap appendectomy 2/2 perf appendicitis    Subjective: Patient seen and examined post operatively. Reports pain well-controlled, denies nausea, vomiting, fever, chills, chest pain, SOB. Has not been able to try liquids since surgery because it was performed overnight.     Objective:  Vitals: T(F): 97.9 (03-03-24 @ 12:00), Max: 99.3 (03-02-24 @ 20:06)  HR: 67 (03-03-24 @ 12:00)  BP: 95/57 (03-03-24 @ 12:00) (92/61 - 113/75)  RR: 16 (03-03-24 @ 12:00)  SpO2: 99% (03-03-24 @ 12:00)    In:   03-02-24 @ 07:01  -  03-03-24 @ 07:00  --------------------------------------------------------  IN: 525 mL    03-03-24 @ 07:01  -  03-03-24 @ 13:38  --------------------------------------------------------  IN: 1000 mL      IV Fluids: lactated ringers. 1000 milliLiter(s) (75 mL/Hr) IV Continuous <Continuous>      Out:   03-02-24 @ 07:01  -  03-03-24 @ 07:00  --------------------------------------------------------  OUT: 20 mL    03-03-24 @ 07:01  -  03-03-24 @ 13:38  --------------------------------------------------------  OUT: 950 mL    Voided Urine:   03-02-24 @ 07:01  -  03-03-24 @ 07:00  --------------------------------------------------------  OUT: 20 mL    03-03-24 @ 07:01  -  03-03-24 @ 13:38  --------------------------------------------------------  OUT: 950 mL      Wright Catheter: yes no     Drains:   AMANDEEP:   03-02-24 @ 07:01  -  03-03-24 @ 07:00  --------------------------------------------------------  OUT: 20 mL    03-03-24 @ 07:01  -  03-03-24 @ 13:38  --------------------------------------------------------  OUT: 200 mL         Physical Examination:  General: NAD, resting comfortably in bed  HEENT: Normocephalic atraumatic  Respiratory: Nonlabored respirations, normal CW expansion.  Cardio: S1S2, regular rate and rhythm.  Abdomen: softly distended, appropriately tender, surgical incisions are c/d/i. AMANDEEP drain SS.  Vascular: extremities are warm and well perfused.     Assessment:  FREEDOM NASH is a 23yFemale patient s/p Lap Appendectomy, currently recovering appropriately with adequate oral tolerance and pain control.     Plan:  - IVF:   - Abx: piperacillin/tazobactam IVPB.. 3.375 Gram(s) IV Intermittent every 8 hours  - Pain: Tylenol, toradol ATC and dilaudid PRN  - Diet: Diet, Clear Liquid (03-03-24 @ 02:43) [Active]  - Monitor drain AMANDEEP  - Activity: OOB/AT  - DVT ppx: SCD's    Date/Time: 03-03-24 @ 13:38

## 2024-03-03 NOTE — PROVIDER CONTACT NOTE (OTHER) - ASSESSMENT
pt with blood pressure 105/52, all other VSS, pt with no complaints of pain of dizziness, nausea or vomiting
pt with blood pressure 101/51, all other VSS, pt resting comfortably in bed, pt denies pain or dizziness

## 2024-03-03 NOTE — PATIENT PROFILE ADULT - NSPROGENBLOODRESTRICT_GEN_A_NUR
Shift report received from Sonia Campuzano RN at infants bedside. Infant identified using name and . Care given to infant during previous shift communicated and issues for upcoming shift addressed. A thorough overview of infant status discussed; including lines/drains/airway/infusion sites/dressing status, and assessment of skin condition. Pain assessment is discussed and current pain score visualized, any interventions needed, and reassessments if needed discussed. Interdisciplinary rounds discussed. [facility] utilized for reporting : medications, recent lab work results, VS, I&O, assessments, current orders, weight, and previous procedures. Feeding type and schedule reported. Plan of care,and discharge needs discussed. Infant remains on cardio/resp monitor with VSS. none

## 2024-03-03 NOTE — PRE-ANESTHESIA EVALUATION ADULT - NSANTHPMHFT_GEN_ALL_CORE
Ms. Ley is a 23 year old woman with a PMH of multiple R hip orthopedic surgeries who presents with 1 day of RLQ pain, nausea, and emesis found to have appendicitis. Meds taken appropriately this morning/last night. Pt states they can ambulate 2 blocks w/o shortness of breath. Able to lay flat w/o issue. Pt explained need for general anesthesia. Risks and benefits explained extensively.

## 2024-03-03 NOTE — CONSULT NOTE ADULT - SUBJECTIVE AND OBJECTIVE BOX
DATE OF SERVICE: 03-03-24 @ 10:39    CHIEF COMPLAINT:Patient is a 23y old  Female who presents with a chief complaint of     HISTORY OF PRESENT ILLNESS:HPI:  Ms. Ley is a 23 year old woman with a PMH of multiple R hip orthopedic surgeries who presents with 1 day of RLQ pain, nausea, and emesis. She reports that last night she became nauseated and numerous episodes of nonbloody nonbilious emesis throughout the night into today. She also noted RLQ pain that was unrelenting but could not necessarily describe if it was crampy or sharp. She did note some subjective chills wihtout measuring fever. She did have her regular period starting around 2 days ago, associated with normal amount of flow. She is regular and typically lasts about 6 days. she denies being sexually active in the past few weeks and denies any history of STI or recent infections of any kind. In the ED the patient is afebrile, mildly hypotensive, and normocardic. Labs were significant for a leukocytosis to 21 and CT demonstrates acute perforated appendicitis.  (02 Mar 2024 23:28)      PAST MEDICAL & SURGICAL HISTORY:  Anxiety and depression              MEDICATIONS:  enoxaparin Injectable 40 milliGRAM(s) SubCutaneous every 24 hours    piperacillin/tazobactam IVPB.. 3.375 Gram(s) IV Intermittent every 8 hours      acetaminophen   IVPB .. 1000 milliGRAM(s) IV Intermittent every 6 hours  escitalopram 40 milliGRAM(s) Oral daily  HYDROmorphone  Injectable 0.5 milliGRAM(s) IV Push every 4 hours PRN  ketorolac   Injectable 30 milliGRAM(s) IV Push every 6 hours  lisdexamfetamine 60 milliGRAM(s) Oral with breakfast        influenza   Vaccine 0.5 milliLiter(s) IntraMuscular once  lactated ringers. 1000 milliLiter(s) IV Continuous <Continuous>      FAMILY HISTORY:      Non-contributory    SOCIAL HISTORY:    Tobacco user     Allergies    No Known Allergies    Intolerances    	    REVIEW OF SYSTEMS:  CONSTITUTIONAL: No fever  EYES: No eye pain, visual disturbances, or discharge  ENMT:  No difficulty hearing, tinnitus  NECK: No pain or stiffness  RESPIRATORY: No cough, wheezing,  CARDIOVASCULAR: No chest pain, palpitations, passing out, dizziness, or leg swelling  GASTROINTESTINAL:  No nausea, vomiting, diarrhea or constipation. No melena.  GENITOURINARY: No dysuria, hematuria  NEUROLOGICAL: No stroke like symptoms  SKIN: No burning or lesions   ENDOCRINE: No heat or cold intolerance  MUSCULOSKELETAL: No joint pain or swelling  PSYCHIATRIC: No  anxiety, mood swings  HEME/LYMPH: No bleeding gums  ALLERGY AND IMMUNOLOGIC: No hives or eczema	    All other ROS negative    PHYSICAL EXAM:  T(C): 36.9 (03-03-24 @ 08:27), Max: 37.4 (03-02-24 @ 20:06)  HR: 67 (03-03-24 @ 08:27) (66 - 94)  BP: 108/56 (03-03-24 @ 08:27) (92/61 - 113/75)  RR: 16 (03-03-24 @ 08:27) (14 - 18)  SpO2: 100% (03-03-24 @ 08:27) (96% - 100%)  Wt(kg): --  I&O's Summary    02 Mar 2024 07:01  -  03 Mar 2024 07:00  --------------------------------------------------------  IN: 525 mL / OUT: 20 mL / NET: 505 mL    03 Mar 2024 07:01  -  03 Mar 2024 10:39  --------------------------------------------------------  IN: 500 mL / OUT: 0 mL / NET: 500 mL        Appearance: Normal	  HEENT:   Normal oral mucosa, EOMI	  Cardiovascular:  S1 S2, No JVD,    Respiratory: Lungs clear to auscultation	  Psychiatry: Alert  Gastrointestinal:  Soft, Non-tender, + BS	  Skin: No rashes   Neurologic: Non-focal  Extremities:  No edema  Vascular: Peripheral pulses palpable    	    	  	  CARDIAC MARKERS:  Labs personally reviewed by me                                  10.3   13.49 )-----------( 230      ( 03 Mar 2024 09:48 )             30.6     03-03    138  |  103  |  7   ----------------------------<  159<H>  4.0   |  24  |  0.49<L>    Ca    8.8      03 Mar 2024 09:48  Phos  3.7     03-03  Mg     1.9     03-03    TPro  7.9  /  Alb  4.9  /  TBili  0.9  /  DBili  x   /  AST  21  /  ALT  18  /  AlkPhos  89  03-02          EKG: Personally reviewed by me - pending   Radiology: Personally reviewed by me - pending       Assessment /Plan:   Ms. Ley is a 23 year old woman with a PMH of multiple R hip orthopedic surgeries who presents with 1 day of RLQ pain, nausea, and emesis. She reports that last night she became nauseated and numerous episodes of nonbloody nonbilious emesis throughout the night into today. She also noted RLQ pain that was unrelenting but could not necessarily describe if it was crampy or sharp. She did note some subjective chills without measuring fever    1. Cardiac risk stratification   - Patient with good exercise tolerance  - denies cp , sob or palpitations at this time  - patient with no cardiac history   - ECG pending   - Patient at mild risk for mild-mod risk LAP APPY     2. Blood pressure   -stable     3. DVT prophylactic  - c/w Lovenox              Differential diagnosis and plan of care discussed with patient after the evaluation. Counseling on diet, nutritional counseling, weight management, exercise and medication compliance was done.   Advanced care planning/advanced directives discussed with patient/family. DNR status including forceful chest compressions to attempt to restart the heart, ventilator support/artificial breathing, electric shock, artificial nutrition, health care proxy, Molst form all discussed with pt. Pt wishes to consider. More than fifteen minutes spent on discussing advanced directives.     LANDON Swenson, DO [facility]  Cardiovascular Medicine  [address], [address]  Available for call or text via [employer]  Office [phone]

## 2024-03-03 NOTE — PROGRESS NOTE ADULT - SUBJECTIVE AND OBJECTIVE BOX
SURGERY DAILY PROGRESS NOTE / POST-OP CHECK    STATUS POST:  Laparoscopic appendectomy        SUBJECTIVE: Pt seen and examined at bedside this AM.      OBJECTIVE:  Vital Signs Last 24 Hrs  T(C): 36.6 (03 Mar 2024 04:21), Max: 37.4 (02 Mar 2024 20:06)  T(F): 97.8 (03 Mar 2024 04:21), Max: 99.3 (02 Mar 2024 20:06)  HR: 79 (03 Mar 2024 04:21) (68 - 94)  BP: 105/52 (03 Mar 2024 04:21) (92/61 - 113/75)  BP(mean): 72 (03 Mar 2024 03:51) (71 - 77)  RR: 18 (03 Mar 2024 04:21) (14 - 18)  SpO2: 96% (03 Mar 2024 04:21) (96% - 100%)    Parameters below as of 03 Mar 2024 04:21  Patient On (Oxygen Delivery Method): room air        I&O's Summary    02 Mar 2024 07:01  -  03 Mar 2024 05:11  --------------------------------------------------------  IN: 325 mL / OUT: 20 mL / NET: 305 mL        Physical Exam:  General Appearance: Appears well, NAD, A& O x 3  Neck: Supple  Chest: Equal expansion bilaterally, equal breath sounds  CV: Pulse regular presently  Abdomen: Soft, appropriate incisional tenderness, mildly distended, incisions c/d/i  Extremities: Grossly symmetric, SCD's in place     LABS:                        10.1   12.74 )-----------( 232      ( 03 Mar 2024 04:07 )             31.6     03-03    137  |  102  |  9   ----------------------------<  149<H>  3.8   |  25  |  0.57    Ca    8.2<L>      03 Mar 2024 04:07  Phos  3.2     03-03  Mg     1.9     03-03    TPro  7.9  /  Alb  4.9  /  TBili  0.9  /  DBili  x   /  AST  21  /  ALT  18  /  AlkPhos  89  03-02    PT/INR - ( 02 Mar 2024 21:34 )   PT: 12.9 sec;   INR: 1.24 ratio         PTT - ( 02 Mar 2024 21:34 )  PTT:29.2 sec  Urinalysis Basic - ( 03 Mar 2024 04:07 )    Color: x / Appearance: x / SG: x / pH: x  Gluc: 149 mg/dL / Ketone: x  / Bili: x / Urobili: x   Blood: x / Protein: x / Nitrite: x   Leuk Esterase: x / RBC: x / WBC x   Sq Epi: x / Non Sq Epi: x / Bacteria: x        RADIOLOGY & ADDITIONAL STUDIES: SURGERY DAILY PROGRESS NOTE / POST-OP CHECK    STATUS POST:  Laparoscopic appendectomy    SUBJECTIVE: Pt seen and examined at bedside this AM. Reports adequate pain control, feels tired because of surgery overnight, but denies nausea/vomit, fever/chills, sob/chest pain. Has not ambulated or eaten anything.       OBJECTIVE:  Vital Signs Last 24 Hrs  T(C): 36.6 (03 Mar 2024 04:21), Max: 37.4 (02 Mar 2024 20:06)  T(F): 97.8 (03 Mar 2024 04:21), Max: 99.3 (02 Mar 2024 20:06)  HR: 79 (03 Mar 2024 04:21) (68 - 94)  BP: 105/52 (03 Mar 2024 04:21) (92/61 - 113/75)  BP(mean): 72 (03 Mar 2024 03:51) (71 - 77)  RR: 18 (03 Mar 2024 04:21) (14 - 18)  SpO2: 96% (03 Mar 2024 04:21) (96% - 100%)    Parameters below as of 03 Mar 2024 04:21  Patient On (Oxygen Delivery Method): room air        I&O's Summary    02 Mar 2024 07:01  -  03 Mar 2024 05:11  --------------------------------------------------------  IN: 325 mL / OUT: 20 mL / NET: 305 mL        Physical Exam:  General Appearance: Appears well, NAD, A& O x 3  Neck: Supple  Chest: Equal expansion bilaterally, equal breath sounds  CV: Pulse regular presently  Abdomen: Soft, appropriate incisional tenderness, mildly distended, incisions c/d/i, AMANDEEP ss output.  Extremities: Grossly symmetric, SCD's in place     LABS:                        10.1   12.74 )-----------( 232      ( 03 Mar 2024 04:07 )             31.6     03-03    137  |  102  |  9   ----------------------------<  149<H>  3.8   |  25  |  0.57    Ca    8.2<L>      03 Mar 2024 04:07  Phos  3.2     03-03  Mg     1.9     03-03    TPro  7.9  /  Alb  4.9  /  TBili  0.9  /  DBili  x   /  AST  21  /  ALT  18  /  AlkPhos  89  03-02    PT/INR - ( 02 Mar 2024 21:34 )   PT: 12.9 sec;   INR: 1.24 ratio         PTT - ( 02 Mar 2024 21:34 )  PTT:29.2 sec  Urinalysis Basic - ( 03 Mar 2024 04:07 )    Color: x / Appearance: x / SG: x / pH: x  Gluc: 149 mg/dL / Ketone: x  / Bili: x / Urobili: x   Blood: x / Protein: x / Nitrite: x   Leuk Esterase: x / RBC: x / WBC x   Sq Epi: x / Non Sq Epi: x / Bacteria: x        RADIOLOGY & ADDITIONAL STUDIES:

## 2024-03-03 NOTE — PROGRESS NOTE ADULT - ASSESSMENT
23F s/p lap appendectomy (3/3).    Plan:  -DVT ppx: lovenox  -IV abx: zosyn  -DIet: CLD  -IVFs  -Pain control  -OOB/Ambulate    Green surgery, [id number] 23F s/p lap appendectomy (3/3) secondary to perforated appendicitis. Recovering appropriately with no acute distress.     Plan:  - IVF 500cc bolus   - Toradol ATC 4 doses  - DVT ppx: lovenox  - IV abx: zosyn  - Diet: CLD  - Pain control  - OOB/Ambulate  - Continue home meds    Green surgery, [id number]

## 2024-03-04 LAB
ANION GAP SERPL CALC-SCNC: 11 MMOL/L — SIGNIFICANT CHANGE UP (ref 5–17)
APPEARANCE UR: CLEAR — SIGNIFICANT CHANGE UP
BACTERIA # UR AUTO: NEGATIVE /HPF — SIGNIFICANT CHANGE UP
BASOPHILS # BLD AUTO: 0.03 K/UL — SIGNIFICANT CHANGE UP (ref 0–0.2)
BASOPHILS NFR BLD AUTO: 0.3 % — SIGNIFICANT CHANGE UP (ref 0–2)
BILIRUB UR-MCNC: NEGATIVE — SIGNIFICANT CHANGE UP
BUN SERPL-MCNC: 9 MG/DL — SIGNIFICANT CHANGE UP (ref 7–23)
CALCIUM SERPL-MCNC: 9.3 MG/DL — SIGNIFICANT CHANGE UP (ref 8.4–10.5)
CAST: 0 /LPF — SIGNIFICANT CHANGE UP (ref 0–4)
CHLORIDE SERPL-SCNC: 102 MMOL/L — SIGNIFICANT CHANGE UP (ref 96–108)
CO2 SERPL-SCNC: 25 MMOL/L — SIGNIFICANT CHANGE UP (ref 22–31)
COLOR SPEC: YELLOW — SIGNIFICANT CHANGE UP
CREAT SERPL-MCNC: 0.62 MG/DL — SIGNIFICANT CHANGE UP (ref 0.5–1.3)
DIFF PNL FLD: ABNORMAL
EGFR: 128 ML/MIN/1.73M2 — SIGNIFICANT CHANGE UP
EOSINOPHIL # BLD AUTO: 0.03 K/UL — SIGNIFICANT CHANGE UP (ref 0–0.5)
EOSINOPHIL NFR BLD AUTO: 0.3 % — SIGNIFICANT CHANGE UP (ref 0–6)
GLUCOSE SERPL-MCNC: 125 MG/DL — HIGH (ref 70–99)
GLUCOSE UR QL: NEGATIVE MG/DL — SIGNIFICANT CHANGE UP
HCT VFR BLD CALC: 28.8 % — LOW (ref 34.5–45)
HGB BLD-MCNC: 9.1 G/DL — LOW (ref 11.5–15.5)
IMM GRANULOCYTES NFR BLD AUTO: 0.2 % — SIGNIFICANT CHANGE UP (ref 0–0.9)
KETONES UR-MCNC: NEGATIVE MG/DL — SIGNIFICANT CHANGE UP
LEUKOCYTE ESTERASE UR-ACNC: ABNORMAL
LYMPHOCYTES # BLD AUTO: 1.28 K/UL — SIGNIFICANT CHANGE UP (ref 1–3.3)
LYMPHOCYTES # BLD AUTO: 14 % — SIGNIFICANT CHANGE UP (ref 13–44)
MAGNESIUM SERPL-MCNC: 1.8 MG/DL — SIGNIFICANT CHANGE UP (ref 1.6–2.6)
MCHC RBC-ENTMCNC: 28.3 PG — SIGNIFICANT CHANGE UP (ref 27–34)
MCHC RBC-ENTMCNC: 31.6 GM/DL — LOW (ref 32–36)
MCV RBC AUTO: 89.4 FL — SIGNIFICANT CHANGE UP (ref 80–100)
MONOCYTES # BLD AUTO: 0.78 K/UL — SIGNIFICANT CHANGE UP (ref 0–0.9)
MONOCYTES NFR BLD AUTO: 8.5 % — SIGNIFICANT CHANGE UP (ref 2–14)
NEUTROPHILS # BLD AUTO: 7 K/UL — SIGNIFICANT CHANGE UP (ref 1.8–7.4)
NEUTROPHILS NFR BLD AUTO: 76.7 % — SIGNIFICANT CHANGE UP (ref 43–77)
NITRITE UR-MCNC: NEGATIVE — SIGNIFICANT CHANGE UP
NRBC # BLD: 0 /100 WBCS — SIGNIFICANT CHANGE UP (ref 0–0)
PH UR: 6.5 — SIGNIFICANT CHANGE UP (ref 5–8)
PHOSPHATE SERPL-MCNC: 2 MG/DL — LOW (ref 2.5–4.5)
PLATELET # BLD AUTO: 209 K/UL — SIGNIFICANT CHANGE UP (ref 150–400)
POTASSIUM SERPL-MCNC: 3.6 MMOL/L — SIGNIFICANT CHANGE UP (ref 3.5–5.3)
POTASSIUM SERPL-SCNC: 3.6 MMOL/L — SIGNIFICANT CHANGE UP (ref 3.5–5.3)
PROT UR-MCNC: NEGATIVE MG/DL — SIGNIFICANT CHANGE UP
RBC # BLD: 3.22 M/UL — LOW (ref 3.8–5.2)
RBC # FLD: 13.2 % — SIGNIFICANT CHANGE UP (ref 10.3–14.5)
RBC CASTS # UR COMP ASSIST: 13 /HPF — HIGH (ref 0–4)
REVIEW: SIGNIFICANT CHANGE UP
SODIUM SERPL-SCNC: 138 MMOL/L — SIGNIFICANT CHANGE UP (ref 135–145)
SP GR SPEC: 1.01 — SIGNIFICANT CHANGE UP (ref 1–1.03)
SQUAMOUS # UR AUTO: 1 /HPF — SIGNIFICANT CHANGE UP (ref 0–5)
UROBILINOGEN FLD QL: 1 MG/DL — SIGNIFICANT CHANGE UP (ref 0.2–1)
WBC # BLD: 9.14 K/UL — SIGNIFICANT CHANGE UP (ref 3.8–10.5)
WBC # FLD AUTO: 9.14 K/UL — SIGNIFICANT CHANGE UP (ref 3.8–10.5)
WBC UR QL: 2 /HPF — SIGNIFICANT CHANGE UP (ref 0–5)

## 2024-03-04 RX ORDER — MAGNESIUM SULFATE 500 MG/ML
2 VIAL (ML) INJECTION ONCE
Refills: 0 | Status: COMPLETED | OUTPATIENT
Start: 2024-03-04 | End: 2024-03-04

## 2024-03-04 RX ORDER — OXYCODONE HYDROCHLORIDE 5 MG/1
5 TABLET ORAL EVERY 4 HOURS
Refills: 0 | Status: DISCONTINUED | OUTPATIENT
Start: 2024-03-04 | End: 2024-03-06

## 2024-03-04 RX ORDER — LANOLIN ALCOHOL/MO/W.PET/CERES
10 CREAM (GRAM) TOPICAL ONCE
Refills: 0 | Status: COMPLETED | OUTPATIENT
Start: 2024-03-04 | End: 2024-03-04

## 2024-03-04 RX ORDER — SODIUM CHLORIDE 9 MG/ML
250 INJECTION, SOLUTION INTRAVENOUS ONCE
Refills: 0 | Status: COMPLETED | OUTPATIENT
Start: 2024-03-04 | End: 2024-03-04

## 2024-03-04 RX ORDER — POTASSIUM CHLORIDE 20 MEQ
40 PACKET (EA) ORAL ONCE
Refills: 0 | Status: COMPLETED | OUTPATIENT
Start: 2024-03-04 | End: 2024-03-04

## 2024-03-04 RX ORDER — SENNA PLUS 8.6 MG/1
1 TABLET ORAL AT BEDTIME
Refills: 0 | Status: DISCONTINUED | OUTPATIENT
Start: 2024-03-04 | End: 2024-03-06

## 2024-03-04 RX ORDER — OXYCODONE HYDROCHLORIDE 5 MG/1
2.5 TABLET ORAL EVERY 4 HOURS
Refills: 0 | Status: DISCONTINUED | OUTPATIENT
Start: 2024-03-04 | End: 2024-03-06

## 2024-03-04 RX ORDER — HYDROMORPHONE HYDROCHLORIDE 2 MG/ML
1 INJECTION INTRAMUSCULAR; INTRAVENOUS; SUBCUTANEOUS EVERY 4 HOURS
Refills: 0 | Status: DISCONTINUED | OUTPATIENT
Start: 2024-03-04 | End: 2024-03-05

## 2024-03-04 RX ORDER — SODIUM,POTASSIUM PHOSPHATES 278-250MG
2 POWDER IN PACKET (EA) ORAL ONCE
Refills: 0 | Status: COMPLETED | OUTPATIENT
Start: 2024-03-04 | End: 2024-03-04

## 2024-03-04 RX ADMIN — OXYCODONE HYDROCHLORIDE 5 MILLIGRAM(S): 5 TABLET ORAL at 09:17

## 2024-03-04 RX ADMIN — Medication 15 MILLIGRAM(S): at 15:12

## 2024-03-04 RX ADMIN — Medication 15 MILLIGRAM(S): at 21:09

## 2024-03-04 RX ADMIN — Medication 15 MILLIGRAM(S): at 21:39

## 2024-03-04 RX ADMIN — Medication 2 PACKET(S): at 13:33

## 2024-03-04 RX ADMIN — Medication 1000 MILLIGRAM(S): at 18:13

## 2024-03-04 RX ADMIN — HYDROMORPHONE HYDROCHLORIDE 1 MILLIGRAM(S): 2 INJECTION INTRAMUSCULAR; INTRAVENOUS; SUBCUTANEOUS at 13:33

## 2024-03-04 RX ADMIN — HYDROMORPHONE HYDROCHLORIDE 1 MILLIGRAM(S): 2 INJECTION INTRAMUSCULAR; INTRAVENOUS; SUBCUTANEOUS at 22:00

## 2024-03-04 RX ADMIN — ENOXAPARIN SODIUM 40 MILLIGRAM(S): 100 INJECTION SUBCUTANEOUS at 11:37

## 2024-03-04 RX ADMIN — OXYCODONE HYDROCHLORIDE 5 MILLIGRAM(S): 5 TABLET ORAL at 10:17

## 2024-03-04 RX ADMIN — Medication 1000 MILLIGRAM(S): at 17:13

## 2024-03-04 RX ADMIN — PIPERACILLIN AND TAZOBACTAM 25 GRAM(S): 4; .5 INJECTION, POWDER, LYOPHILIZED, FOR SOLUTION INTRAVENOUS at 17:13

## 2024-03-04 RX ADMIN — SODIUM CHLORIDE 500 MILLILITER(S): 9 INJECTION, SOLUTION INTRAVENOUS at 13:34

## 2024-03-04 RX ADMIN — Medication 1000 MILLIGRAM(S): at 05:29

## 2024-03-04 RX ADMIN — Medication 15 MILLIGRAM(S): at 06:54

## 2024-03-04 RX ADMIN — Medication 1000 MILLIGRAM(S): at 12:37

## 2024-03-04 RX ADMIN — Medication 40 MILLIEQUIVALENT(S): at 13:33

## 2024-03-04 RX ADMIN — HYDROMORPHONE HYDROCHLORIDE 1 MILLIGRAM(S): 2 INJECTION INTRAMUSCULAR; INTRAVENOUS; SUBCUTANEOUS at 14:33

## 2024-03-04 RX ADMIN — HYDROMORPHONE HYDROCHLORIDE 1 MILLIGRAM(S): 2 INJECTION INTRAMUSCULAR; INTRAVENOUS; SUBCUTANEOUS at 22:30

## 2024-03-04 RX ADMIN — Medication 1000 MILLIGRAM(S): at 00:04

## 2024-03-04 RX ADMIN — SENNA PLUS 1 TABLET(S): 8.6 TABLET ORAL at 21:59

## 2024-03-04 RX ADMIN — HYDROMORPHONE HYDROCHLORIDE 0.5 MILLIGRAM(S): 2 INJECTION INTRAMUSCULAR; INTRAVENOUS; SUBCUTANEOUS at 03:23

## 2024-03-04 RX ADMIN — Medication 10 MILLIGRAM(S): at 21:39

## 2024-03-04 RX ADMIN — PIPERACILLIN AND TAZOBACTAM 25 GRAM(S): 4; .5 INJECTION, POWDER, LYOPHILIZED, FOR SOLUTION INTRAVENOUS at 02:02

## 2024-03-04 RX ADMIN — Medication 1000 MILLIGRAM(S): at 11:37

## 2024-03-04 RX ADMIN — PIPERACILLIN AND TAZOBACTAM 25 GRAM(S): 4; .5 INJECTION, POWDER, LYOPHILIZED, FOR SOLUTION INTRAVENOUS at 09:17

## 2024-03-04 RX ADMIN — Medication 25 GRAM(S): at 13:34

## 2024-03-04 RX ADMIN — Medication 1000 MILLIGRAM(S): at 23:49

## 2024-03-04 RX ADMIN — SODIUM CHLORIDE 75 MILLILITER(S): 9 INJECTION, SOLUTION INTRAVENOUS at 21:09

## 2024-03-04 RX ADMIN — HYDROMORPHONE HYDROCHLORIDE 0.5 MILLIGRAM(S): 2 INJECTION INTRAMUSCULAR; INTRAVENOUS; SUBCUTANEOUS at 02:53

## 2024-03-04 RX ADMIN — ESCITALOPRAM OXALATE 40 MILLIGRAM(S): 10 TABLET, FILM COATED ORAL at 11:37

## 2024-03-04 RX ADMIN — Medication 15 MILLIGRAM(S): at 06:24

## 2024-03-04 RX ADMIN — LISDEXAMFETAMINE DIMESYLATE 60 MILLIGRAM(S): 70 CAPSULE ORAL at 09:11

## 2024-03-04 RX ADMIN — Medication 1000 MILLIGRAM(S): at 06:29

## 2024-03-04 RX ADMIN — Medication 15 MILLIGRAM(S): at 16:12

## 2024-03-04 NOTE — PROGRESS NOTE ADULT - ASSESSMENT
23F s/p lap appendectomy (3/3) secondary to perforated appendicitis. Recovering appropriately with no acute distress.     Plan:  - Toradol ATC 4 doses  - DVT ppx: lovenox  - IV abx: zosyn  - Diet: CLD  - Pain control  - OOB/Ambulate  - Continue home meds    Green surgery, [id number] 23F s/p lap appendectomy (3/3) secondary to perforated appendicitis. Hemodynamically stable, with inadequate pain control and with inadequate spontaneous voiding since hanks came out. However, has adequate po intake and labs note a downtrending WBC count.     Plan:  - Optimize pain regimen, non narcotics ATC and oxycodone PRN  - IV abx: zosyn  - Diet: CLD  - Pain control  - DVT ppx: lovenox  - Continue home meds  - Monitor AMANDEEP drain.  - OOB/Ambulate    Green surgery, [id number]

## 2024-03-04 NOTE — PROGRESS NOTE ADULT - SUBJECTIVE AND OBJECTIVE BOX
Surgery Progress Note     24hour Events:   - lap appy    Subjective:  Patient seen and examined.     Vital Signs:  Vital Signs Last 24 Hrs  T(C): 36.8 (04 Mar 2024 01:23), Max: 36.9 (03 Mar 2024 08:27)  T(F): 98.3 (04 Mar 2024 01:23), Max: 98.4 (03 Mar 2024 08:27)  HR: 81 (04 Mar 2024 01:23) (66 - 87)  BP: 109/70 (04 Mar 2024 01:23) (95/57 - 109/70)  BP(mean): --  RR: 16 (04 Mar 2024 01:23) (16 - 18)  SpO2: 97% (04 Mar 2024 01:23) (96% - 100%)    Parameters below as of 04 Mar 2024 01:23  Patient On (Oxygen Delivery Method): room air    Physical Exam:  General: NAD, resting comfortably in bed  HEENT: Normocephalic atraumatic  Respiratory: Nonlabored respirations  Cardio: pulse present  Abdomen: soft, nontender, nondistended    Labs:    03-03    138  |  103  |  7   ----------------------------<  159<H>  4.0   |  24  |  0.49<L>    Ca    8.8      03 Mar 2024 09:48  Phos  3.7     03-03  Mg     1.9     03-03    TPro  7.9  /  Alb  4.9  /  TBili  0.9  /  DBili  x   /  AST  21  /  ALT  18  /  AlkPhos  89  03-02    LIVER FUNCTIONS - ( 02 Mar 2024 21:34 )  Alb: 4.9 g/dL / Pro: 7.9 g/dL / ALK PHOS: 89 U/L / ALT: 18 U/L / AST: 21 U/L / GGT: x                                 10.3   13.49 )-----------( 230      ( 03 Mar 2024 09:48 )             30.6     PT/INR - ( 02 Mar 2024 21:34 )   PT: 12.9 sec;   INR: 1.24 ratio         PTT - ( 02 Mar 2024 21:34 )  PTT:29.2 sec     Surgery Progress Note     24hour Events:   - lap appy    Subjective:  Patient seen and examined. Denies nausea/vomit, reports persistent abdominal pain that is not relieved by tylenol or toradol. Reports that dilaudid is the only medication that helps for a couple of hours. Has been minimally ambulating and reports no voids since hanks was removed.     Vital Signs:  Vital Signs Last 24 Hrs  T(C): 36.8 (04 Mar 2024 01:23), Max: 36.9 (03 Mar 2024 08:27)  T(F): 98.3 (04 Mar 2024 01:23), Max: 98.4 (03 Mar 2024 08:27)  HR: 81 (04 Mar 2024 01:23) (66 - 87)  BP: 109/70 (04 Mar 2024 01:23) (95/57 - 109/70)  BP(mean): --  RR: 16 (04 Mar 2024 01:23) (16 - 18)  SpO2: 97% (04 Mar 2024 01:23) (96% - 100%)    Parameters below as of 04 Mar 2024 01:23  Patient On (Oxygen Delivery Method): room air    Physical Exam:  General: NAD, resting comfortably in bed  HEENT: Normocephalic atraumatic  Respiratory: Nonlabored respirations  Cardio: pulse present  Abdomen: soft, appropriately tender, minimally distended, incision c/d/i. AMANDEEP w/ SS output.     Labs:    03-03    138  |  103  |  7   ----------------------------<  159<H>  4.0   |  24  |  0.49<L>    Ca    8.8      03 Mar 2024 09:48  Phos  3.7     03-03  Mg     1.9     03-03    TPro  7.9  /  Alb  4.9  /  TBili  0.9  /  DBili  x   /  AST  21  /  ALT  18  /  AlkPhos  89  03-02    LIVER FUNCTIONS - ( 02 Mar 2024 21:34 )  Alb: 4.9 g/dL / Pro: 7.9 g/dL / ALK PHOS: 89 U/L / ALT: 18 U/L / AST: 21 U/L / GGT: x                                 10.3   13.49 )-----------( 230      ( 03 Mar 2024 09:48 )             30.6     PT/INR - ( 02 Mar 2024 21:34 )   PT: 12.9 sec;   INR: 1.24 ratio         PTT - ( 02 Mar 2024 21:34 )  PTT:29.2 sec

## 2024-03-04 NOTE — PROGRESS NOTE ADULT - SUBJECTIVE AND OBJECTIVE BOX
DATE OF SERVICE: 03-04-24 @ 18:54    Patient is a 23y old  Female who presents with a chief complaint of Appendicitis (03 Mar 2024 10:39)      INTERVAL HISTORY: feels well    REVIEW OF SYSTEMS:  CONSTITUTIONAL: No weakness  EYES/ENT: No visual changes;  No throat pain   NECK: No pain or stiffness  RESPIRATORY: No cough, wheezing; No shortness of breath  CARDIOVASCULAR: No chest pain or palpitations  GASTROINTESTINAL: No abdominal  pain. No nausea, vomiting, or hematemesis  GENITOURINARY: No dysuria, frequency or hematuria  NEUROLOGICAL: No stroke like symptoms  SKIN: No rashes      	  MEDICATIONS:        PHYSICAL EXAM:  T(C): 37 (03-04-24 @ 17:03), Max: 37 (03-04-24 @ 17:03)  HR: 80 (03-04-24 @ 17:03) (73 - 87)  BP: 113/66 (03-04-24 @ 17:03) (103/63 - 113/66)  RR: 18 (03-04-24 @ 17:03) (16 - 18)  SpO2: 99% (03-04-24 @ 17:03) (97% - 100%)  Wt(kg): --  I&O's Summary    03 Mar 2024 07:01  -  04 Mar 2024 07:00  --------------------------------------------------------  IN: 3480 mL / OUT: 2180 mL / NET: 1300 mL    04 Mar 2024 07:01  -  04 Mar 2024 18:54  --------------------------------------------------------  IN: 1750 mL / OUT: 1190 mL / NET: 560 mL          Appearance: In no distress	  HEENT:    PERRL, EOMI	  Cardiovascular:  S1 S2, No JVD  Respiratory: Lungs clear to auscultation	  Gastrointestinal:  Soft, Non-tender, + BS	  Vascularature:  No edema of LE  Psychiatric: Appropriate affect   Neuro: no acute focal deficits                               9.1    9.14  )-----------( 209      ( 04 Mar 2024 07:45 )             28.8     03-04    138  |  102  |  9   ----------------------------<  125<H>  3.6   |  25  |  0.62    Ca    9.3      04 Mar 2024 07:45  Phos  2.0     03-04  Mg     1.8     03-04    TPro  7.9  /  Alb  4.9  /  TBili  0.9  /  DBili  x   /  AST  21  /  ALT  18  /  AlkPhos  89  03-02        Labs personally reviewed      ASSESSMENT/PLAN: 	  Ms. Ley is a 23 year old woman with a PMH of multiple R hip orthopedic surgeries who presents with 1 day of RLQ pain, nausea, and emesis. She reports that last night she became nauseated and numerous episodes of nonbloody nonbilious emesis throughout the night into today. She also noted RLQ pain that was unrelenting but could not necessarily describe if it was crampy or sharp. She did note some subjective chills without measuring fever    1. Cardiac risk stratification   - Patient with good exercise tolerance  - denies cp , sob or palpitations at this time  - patient with no cardiac history   - ECG pending   - Patient at mild risk for mild-mod risk LAP APPY. Now s/p successful procedure    2. Blood pressure   -stable     3. DVT prophylactic  - c/w Lovenox              Iolani Behrbom, AG-NP   Fabian Ny DO [facility]  Cardiovascular Medicine  [address] [place] [address], [address]  Available through call or text on [employer] TE[employer]  Office: [phone]

## 2024-03-05 LAB
ANION GAP SERPL CALC-SCNC: 11 MMOL/L — SIGNIFICANT CHANGE UP (ref 5–17)
BUN SERPL-MCNC: 11 MG/DL — SIGNIFICANT CHANGE UP (ref 7–23)
CALCIUM SERPL-MCNC: 9.4 MG/DL — SIGNIFICANT CHANGE UP (ref 8.4–10.5)
CHLORIDE SERPL-SCNC: 100 MMOL/L — SIGNIFICANT CHANGE UP (ref 96–108)
CO2 SERPL-SCNC: 27 MMOL/L — SIGNIFICANT CHANGE UP (ref 22–31)
CREAT SERPL-MCNC: 0.56 MG/DL — SIGNIFICANT CHANGE UP (ref 0.5–1.3)
EGFR: 131 ML/MIN/1.73M2 — SIGNIFICANT CHANGE UP
GLUCOSE SERPL-MCNC: 108 MG/DL — HIGH (ref 70–99)
HCT VFR BLD CALC: 31.3 % — LOW (ref 34.5–45)
HGB BLD-MCNC: 10.1 G/DL — LOW (ref 11.5–15.5)
MAGNESIUM SERPL-MCNC: 1.9 MG/DL — SIGNIFICANT CHANGE UP (ref 1.6–2.6)
MCHC RBC-ENTMCNC: 28.7 PG — SIGNIFICANT CHANGE UP (ref 27–34)
MCHC RBC-ENTMCNC: 32.3 GM/DL — SIGNIFICANT CHANGE UP (ref 32–36)
MCV RBC AUTO: 88.9 FL — SIGNIFICANT CHANGE UP (ref 80–100)
NRBC # BLD: 0 /100 WBCS — SIGNIFICANT CHANGE UP (ref 0–0)
PHOSPHATE SERPL-MCNC: 2.5 MG/DL — SIGNIFICANT CHANGE UP (ref 2.5–4.5)
PLATELET # BLD AUTO: 251 K/UL — SIGNIFICANT CHANGE UP (ref 150–400)
POTASSIUM SERPL-MCNC: 3.8 MMOL/L — SIGNIFICANT CHANGE UP (ref 3.5–5.3)
POTASSIUM SERPL-SCNC: 3.8 MMOL/L — SIGNIFICANT CHANGE UP (ref 3.5–5.3)
RBC # BLD: 3.52 M/UL — LOW (ref 3.8–5.2)
RBC # FLD: 13 % — SIGNIFICANT CHANGE UP (ref 10.3–14.5)
SODIUM SERPL-SCNC: 138 MMOL/L — SIGNIFICANT CHANGE UP (ref 135–145)
WBC # BLD: 9.87 K/UL — SIGNIFICANT CHANGE UP (ref 3.8–10.5)
WBC # FLD AUTO: 9.87 K/UL — SIGNIFICANT CHANGE UP (ref 3.8–10.5)

## 2024-03-05 RX ORDER — POTASSIUM CHLORIDE 20 MEQ
20 PACKET (EA) ORAL ONCE
Refills: 0 | Status: COMPLETED | OUTPATIENT
Start: 2024-03-05 | End: 2024-03-05

## 2024-03-05 RX ORDER — DEXTROSE MONOHYDRATE, SODIUM CHLORIDE, AND POTASSIUM CHLORIDE 50; .745; 4.5 G/1000ML; G/1000ML; G/1000ML
1000 INJECTION, SOLUTION INTRAVENOUS
Refills: 0 | Status: DISCONTINUED | OUTPATIENT
Start: 2024-03-05 | End: 2024-03-05

## 2024-03-05 RX ORDER — ONDANSETRON 8 MG/1
4 TABLET, FILM COATED ORAL ONCE
Refills: 0 | Status: COMPLETED | OUTPATIENT
Start: 2024-03-05 | End: 2024-03-05

## 2024-03-05 RX ORDER — SODIUM,POTASSIUM PHOSPHATES 278-250MG
1 POWDER IN PACKET (EA) ORAL ONCE
Refills: 0 | Status: COMPLETED | OUTPATIENT
Start: 2024-03-05 | End: 2024-03-05

## 2024-03-05 RX ADMIN — Medication 15 MILLIGRAM(S): at 21:30

## 2024-03-05 RX ADMIN — OXYCODONE HYDROCHLORIDE 5 MILLIGRAM(S): 5 TABLET ORAL at 22:36

## 2024-03-05 RX ADMIN — Medication 15 MILLIGRAM(S): at 13:08

## 2024-03-05 RX ADMIN — Medication 20 MILLIEQUIVALENT(S): at 10:18

## 2024-03-05 RX ADMIN — Medication 5 MILLIGRAM(S): at 20:50

## 2024-03-05 RX ADMIN — Medication 1000 MILLIGRAM(S): at 06:32

## 2024-03-05 RX ADMIN — HYDROMORPHONE HYDROCHLORIDE 1 MILLIGRAM(S): 2 INJECTION INTRAMUSCULAR; INTRAVENOUS; SUBCUTANEOUS at 07:28

## 2024-03-05 RX ADMIN — Medication 1000 MILLIGRAM(S): at 18:42

## 2024-03-05 RX ADMIN — Medication 1000 MILLIGRAM(S): at 11:10

## 2024-03-05 RX ADMIN — Medication 15 MILLIGRAM(S): at 20:50

## 2024-03-05 RX ADMIN — OXYCODONE HYDROCHLORIDE 5 MILLIGRAM(S): 5 TABLET ORAL at 23:04

## 2024-03-05 RX ADMIN — DEXTROSE MONOHYDRATE, SODIUM CHLORIDE, AND POTASSIUM CHLORIDE 75 MILLILITER(S): 50; .745; 4.5 INJECTION, SOLUTION INTRAVENOUS at 07:28

## 2024-03-05 RX ADMIN — HYDROMORPHONE HYDROCHLORIDE 1 MILLIGRAM(S): 2 INJECTION INTRAMUSCULAR; INTRAVENOUS; SUBCUTANEOUS at 02:15

## 2024-03-05 RX ADMIN — Medication 1000 MILLIGRAM(S): at 23:04

## 2024-03-05 RX ADMIN — PIPERACILLIN AND TAZOBACTAM 25 GRAM(S): 4; .5 INJECTION, POWDER, LYOPHILIZED, FOR SOLUTION INTRAVENOUS at 02:14

## 2024-03-05 RX ADMIN — HYDROMORPHONE HYDROCHLORIDE 1 MILLIGRAM(S): 2 INJECTION INTRAMUSCULAR; INTRAVENOUS; SUBCUTANEOUS at 02:45

## 2024-03-05 RX ADMIN — OXYCODONE HYDROCHLORIDE 5 MILLIGRAM(S): 5 TABLET ORAL at 16:54

## 2024-03-05 RX ADMIN — OXYCODONE HYDROCHLORIDE 5 MILLIGRAM(S): 5 TABLET ORAL at 15:54

## 2024-03-05 RX ADMIN — Medication 15 MILLIGRAM(S): at 05:32

## 2024-03-05 RX ADMIN — Medication 15 MILLIGRAM(S): at 06:02

## 2024-03-05 RX ADMIN — ENOXAPARIN SODIUM 40 MILLIGRAM(S): 100 INJECTION SUBCUTANEOUS at 10:21

## 2024-03-05 RX ADMIN — HYDROMORPHONE HYDROCHLORIDE 1 MILLIGRAM(S): 2 INJECTION INTRAMUSCULAR; INTRAVENOUS; SUBCUTANEOUS at 07:58

## 2024-03-05 RX ADMIN — Medication 1000 MILLIGRAM(S): at 23:56

## 2024-03-05 RX ADMIN — Medication 1000 MILLIGRAM(S): at 05:32

## 2024-03-05 RX ADMIN — Medication 1000 MILLIGRAM(S): at 00:49

## 2024-03-05 RX ADMIN — PIPERACILLIN AND TAZOBACTAM 25 GRAM(S): 4; .5 INJECTION, POWDER, LYOPHILIZED, FOR SOLUTION INTRAVENOUS at 18:41

## 2024-03-05 RX ADMIN — ONDANSETRON 4 MILLIGRAM(S): 8 TABLET, FILM COATED ORAL at 10:18

## 2024-03-05 RX ADMIN — LISDEXAMFETAMINE DIMESYLATE 60 MILLIGRAM(S): 70 CAPSULE ORAL at 10:21

## 2024-03-05 RX ADMIN — SENNA PLUS 1 TABLET(S): 8.6 TABLET ORAL at 20:50

## 2024-03-05 RX ADMIN — ESCITALOPRAM OXALATE 40 MILLIGRAM(S): 10 TABLET, FILM COATED ORAL at 10:23

## 2024-03-05 RX ADMIN — Medication 1 PACKET(S): at 10:18

## 2024-03-05 RX ADMIN — PIPERACILLIN AND TAZOBACTAM 25 GRAM(S): 4; .5 INJECTION, POWDER, LYOPHILIZED, FOR SOLUTION INTRAVENOUS at 10:19

## 2024-03-05 NOTE — PROGRESS NOTE ADULT - SUBJECTIVE AND OBJECTIVE BOX
DATE OF SERVICE: 03-05-24 @ 18:14    Patient is a 23y old  Female who presents with a chief complaint of Appendicitis (03 Mar 2024 10:39)      INTERVAL HISTORY: feels okay    REVIEW OF SYSTEMS:  CONSTITUTIONAL: No weakness  EYES/ENT: No visual changes;  No throat pain   NECK: No pain or stiffness  RESPIRATORY: No cough, wheezing; No shortness of breath  CARDIOVASCULAR: No chest pain or palpitations  GASTROINTESTINAL: No abdominal  pain. No nausea, vomiting, or hematemesis  GENITOURINARY: No dysuria, frequency or hematuria  NEUROLOGICAL: No stroke like symptoms  SKIN: No rashes      	  MEDICATIONS:        PHYSICAL EXAM:  T(C): 37.1 (03-05-24 @ 13:05), Max: 37.1 (03-05-24 @ 13:05)  HR: 81 (03-05-24 @ 13:05) (72 - 90)  BP: 106/68 (03-05-24 @ 13:05) (98/61 - 123/76)  RR: 18 (03-05-24 @ 13:05) (18 - 18)  SpO2: 100% (03-05-24 @ 13:05) (95% - 100%)  Wt(kg): --  I&O's Summary    04 Mar 2024 07:01  -  05 Mar 2024 07:00  --------------------------------------------------------  IN: 3110 mL / OUT: 1920 mL / NET: 1190 mL    05 Mar 2024 07:01  -  05 Mar 2024 18:14  --------------------------------------------------------  IN: 0 mL / OUT: 785 mL / NET: -785 mL          Appearance: In no distress	  HEENT:    PERRL, EOMI	  Cardiovascular:  S1 S2, No JVD  Respiratory: Lungs clear to auscultation	  Gastrointestinal:  Soft, Non-tender, + BS	  Vascularature:  No edema of LE  Psychiatric: Appropriate affect   Neuro: no acute focal deficits                               10.1   9.87  )-----------( 251      ( 05 Mar 2024 07:10 )             31.3     03-05    138  |  100  |  11  ----------------------------<  108<H>  3.8   |  27  |  0.56    Ca    9.4      05 Mar 2024 07:15  Phos  2.5     03-05  Mg     1.9     03-05          Labs personally reviewed      ASSESSMENT/PLAN: 	    Ms. Ley is a 23 year old woman with a PMH of multiple R hip orthopedic surgeries who presents with 1 day of RLQ pain, nausea, and emesis. She reports that last night she became nauseated and numerous episodes of nonbloody nonbilious emesis throughout the night into today. She also noted RLQ pain that was unrelenting but could not necessarily describe if it was crampy or sharp. She did note some subjective chills without measuring fever    1. Cardiac risk stratification   - Patient with good exercise tolerance  - denies cp , sob or palpitations at this time  - patient with no cardiac history   - ECG NSR  - Patient at mild risk for mild-mod risk LAP APPY. Now s/p successful procedure    2. Blood pressure   -stable     3. DVT prophylactic  - c/w Lovenox                    Iolani Behrbom, AG-NP   Fabian Ny DO [facility]  Cardiovascular Medicine  [address] [place] [address], [address]  Available through call or text on [employer] TEAM[employer]  Office: [phone]

## 2024-03-05 NOTE — PROGRESS NOTE ADULT - ASSESSMENT
23F s/p lap appendectomy (3/3) secondary to perforated appendicitis. Hemodynamically stable, with inadequate pain control and with inadequate spontaneous voiding since hanks came out. However, has adequate po intake and labs note a downtrending WBC count.     Plan:  - Optimize pain regimen, non narcotics ATC and oxycodone PRN  - IV abx: zosyn  - Diet: CLD  - Pain control  - DVT ppx: lovenox  - Continue home meds  - Monitor AMANDEEP drain.  - OOB/Ambulate    Green surgery, [id number] 23F s/p lap appendectomy (3/3) secondary to perforated appendicitis- Hemodynamically stable, tolerating PO with mild improvement post-operatively.      Plan:  - Optimize pain regimen, non narcotics ATC and oxycodone PRN  - advance to regular diet, as toelrated  - IV abx: zosyn  - DVT ppx: lovenox  - Continue home meds  - Monitor AMANDEEP drain.  - OOB/Ambulate    Green surgery, [id number]

## 2024-03-05 NOTE — PROGRESS NOTE ADULT - SUBJECTIVE AND OBJECTIVE BOX
Surgery Progress Note     24hour Events:   - hanks placed due to urinary retention     Subjective:  Patient seen and examined.   Vital Signs:  Vital Signs Last 24 Hrs  T(C): 36.7 (05 Mar 2024 01:06), Max: 37 (04 Mar 2024 17:03)  T(F): 98 (05 Mar 2024 01:06), Max: 98.6 (04 Mar 2024 17:03)  HR: 90 (05 Mar 2024 01:06) (72 - 90)  BP: 110/73 (05 Mar 2024 01:06) (98/61 - 113/71)  BP(mean): --  RR: 18 (05 Mar 2024 01:06) (16 - 18)  SpO2: 97% (05 Mar 2024 01:06) (97% - 100%)    Parameters below as of 05 Mar 2024 01:06  Patient On (Oxygen Delivery Method): room air    Physical Exam:  General: NAD, resting comfortably in bed  Respiratory: Nonlabored respirations  Abdomen: soft, appropriately tender, minimally distended, incision c/d/i. AMANDEEP w/ SS output.     Labs:    03-04    138  |  102  |  9   ----------------------------<  125<H>  3.6   |  25  |  0.62    Ca    9.3      04 Mar 2024 07:45  Phos  2.0     03-04  Mg     1.8     03-04                              9.1    9.14  )-----------( 209      ( 04 Mar 2024 07:45 )             28.8    Surgery Progress Note     24hour Events:   - hanks placed due to urinary retention     Subjective:  Patient seen and examined. Pain controlled on current regiment. Reports that her sleep has been irregular Passing flatus. No BM.     Vital Signs:  Vital Signs Last 24 Hrs  T(C): 36.7 (05 Mar 2024 01:06), Max: 37 (04 Mar 2024 17:03)  T(F): 98 (05 Mar 2024 01:06), Max: 98.6 (04 Mar 2024 17:03)  HR: 90 (05 Mar 2024 01:06) (72 - 90)  BP: 110/73 (05 Mar 2024 01:06) (98/61 - 113/71)  BP(mean): --  RR: 18 (05 Mar 2024 01:06) (16 - 18)  SpO2: 97% (05 Mar 2024 01:06) (97% - 100%)    Parameters below as of 05 Mar 2024 01:06  Patient On (Oxygen Delivery Method): room air    Physical Exam:  General: NAD, resting comfortably in bed  Respiratory: Nonlabored respirations  Abdomen: soft, appropriately tender, minimally distended, incision c/d/i. AMANDEEP w/ SS output.     Labs:    03-04    138  |  102  |  9   ----------------------------<  125<H>  3.6   |  25  |  0.62    Ca    9.3      04 Mar 2024 07:45  Phos  2.0     03-04  Mg     1.8     03-04                              9.1    9.14  )-----------( 209      ( 04 Mar 2024 07:45 )             28.8

## 2024-03-06 ENCOUNTER — TRANSCRIPTION ENCOUNTER (OUTPATIENT)
Age: 24
End: 2024-03-06

## 2024-03-06 VITALS
TEMPERATURE: 98 F | RESPIRATION RATE: 18 BRPM | OXYGEN SATURATION: 100 % | DIASTOLIC BLOOD PRESSURE: 72 MMHG | HEART RATE: 75 BPM | SYSTOLIC BLOOD PRESSURE: 123 MMHG

## 2024-03-06 LAB
ANION GAP SERPL CALC-SCNC: 7 MMOL/L — SIGNIFICANT CHANGE UP (ref 5–17)
BUN SERPL-MCNC: 9 MG/DL — SIGNIFICANT CHANGE UP (ref 7–23)
CALCIUM SERPL-MCNC: 9 MG/DL — SIGNIFICANT CHANGE UP (ref 8.4–10.5)
CHLORIDE SERPL-SCNC: 101 MMOL/L — SIGNIFICANT CHANGE UP (ref 96–108)
CO2 SERPL-SCNC: 29 MMOL/L — SIGNIFICANT CHANGE UP (ref 22–31)
CREAT FLD-MCNC: 0.54 MG/DL — SIGNIFICANT CHANGE UP
CREAT SERPL-MCNC: 0.59 MG/DL — SIGNIFICANT CHANGE UP (ref 0.5–1.3)
EGFR: 130 ML/MIN/1.73M2 — SIGNIFICANT CHANGE UP
GLUCOSE SERPL-MCNC: 96 MG/DL — SIGNIFICANT CHANGE UP (ref 70–99)
HCT VFR BLD CALC: 28.6 % — LOW (ref 34.5–45)
HGB BLD-MCNC: 9.1 G/DL — LOW (ref 11.5–15.5)
MAGNESIUM SERPL-MCNC: 1.9 MG/DL — SIGNIFICANT CHANGE UP (ref 1.6–2.6)
MCHC RBC-ENTMCNC: 28.8 PG — SIGNIFICANT CHANGE UP (ref 27–34)
MCHC RBC-ENTMCNC: 31.8 GM/DL — LOW (ref 32–36)
MCV RBC AUTO: 90.5 FL — SIGNIFICANT CHANGE UP (ref 80–100)
NRBC # BLD: 0 /100 WBCS — SIGNIFICANT CHANGE UP (ref 0–0)
PHOSPHATE SERPL-MCNC: 3 MG/DL — SIGNIFICANT CHANGE UP (ref 2.5–4.5)
PLATELET # BLD AUTO: 261 K/UL — SIGNIFICANT CHANGE UP (ref 150–400)
POTASSIUM SERPL-MCNC: 4.3 MMOL/L — SIGNIFICANT CHANGE UP (ref 3.5–5.3)
POTASSIUM SERPL-SCNC: 4.3 MMOL/L — SIGNIFICANT CHANGE UP (ref 3.5–5.3)
RBC # BLD: 3.16 M/UL — LOW (ref 3.8–5.2)
RBC # FLD: 13.2 % — SIGNIFICANT CHANGE UP (ref 10.3–14.5)
SODIUM SERPL-SCNC: 137 MMOL/L — SIGNIFICANT CHANGE UP (ref 135–145)
SURGICAL PATHOLOGY STUDY: SIGNIFICANT CHANGE UP
WBC # BLD: 7.77 K/UL — SIGNIFICANT CHANGE UP (ref 3.8–10.5)
WBC # FLD AUTO: 7.77 K/UL — SIGNIFICANT CHANGE UP (ref 3.8–10.5)

## 2024-03-06 PROCEDURE — 36415 COLL VENOUS BLD VENIPUNCTURE: CPT

## 2024-03-06 PROCEDURE — 74177 CT ABD & PELVIS W/CONTRAST: CPT | Mod: MC

## 2024-03-06 PROCEDURE — 83690 ASSAY OF LIPASE: CPT

## 2024-03-06 PROCEDURE — 80053 COMPREHEN METABOLIC PANEL: CPT

## 2024-03-06 PROCEDURE — 85025 COMPLETE CBC W/AUTO DIFF WBC: CPT

## 2024-03-06 PROCEDURE — 86850 RBC ANTIBODY SCREEN: CPT

## 2024-03-06 PROCEDURE — 99285 EMERGENCY DEPT VISIT HI MDM: CPT | Mod: 25

## 2024-03-06 PROCEDURE — 85610 PROTHROMBIN TIME: CPT

## 2024-03-06 PROCEDURE — 85014 HEMATOCRIT: CPT

## 2024-03-06 PROCEDURE — 82435 ASSAY OF BLOOD CHLORIDE: CPT

## 2024-03-06 PROCEDURE — 82803 BLOOD GASES ANY COMBINATION: CPT

## 2024-03-06 PROCEDURE — 85730 THROMBOPLASTIN TIME PARTIAL: CPT

## 2024-03-06 PROCEDURE — 86900 BLOOD TYPING SEROLOGIC ABO: CPT

## 2024-03-06 PROCEDURE — 82570 ASSAY OF URINE CREATININE: CPT

## 2024-03-06 PROCEDURE — 88304 TISSUE EXAM BY PATHOLOGIST: CPT

## 2024-03-06 PROCEDURE — 85027 COMPLETE CBC AUTOMATED: CPT

## 2024-03-06 PROCEDURE — C1889: CPT

## 2024-03-06 PROCEDURE — 84132 ASSAY OF SERUM POTASSIUM: CPT

## 2024-03-06 PROCEDURE — 96375 TX/PRO/DX INJ NEW DRUG ADDON: CPT

## 2024-03-06 PROCEDURE — 84295 ASSAY OF SERUM SODIUM: CPT

## 2024-03-06 PROCEDURE — 93005 ELECTROCARDIOGRAM TRACING: CPT

## 2024-03-06 PROCEDURE — 82947 ASSAY GLUCOSE BLOOD QUANT: CPT

## 2024-03-06 PROCEDURE — 80048 BASIC METABOLIC PNL TOTAL CA: CPT

## 2024-03-06 PROCEDURE — 81001 URINALYSIS AUTO W/SCOPE: CPT

## 2024-03-06 PROCEDURE — 86901 BLOOD TYPING SEROLOGIC RH(D): CPT

## 2024-03-06 PROCEDURE — 83605 ASSAY OF LACTIC ACID: CPT

## 2024-03-06 PROCEDURE — 84100 ASSAY OF PHOSPHORUS: CPT

## 2024-03-06 PROCEDURE — 83735 ASSAY OF MAGNESIUM: CPT

## 2024-03-06 PROCEDURE — 85018 HEMOGLOBIN: CPT

## 2024-03-06 PROCEDURE — 82330 ASSAY OF CALCIUM: CPT

## 2024-03-06 PROCEDURE — 96374 THER/PROPH/DIAG INJ IV PUSH: CPT

## 2024-03-06 PROCEDURE — 84702 CHORIONIC GONADOTROPIN TEST: CPT

## 2024-03-06 RX ORDER — HYDROMORPHONE HYDROCHLORIDE 2 MG/ML
0.25 INJECTION INTRAMUSCULAR; INTRAVENOUS; SUBCUTANEOUS ONCE
Refills: 0 | Status: DISCONTINUED | OUTPATIENT
Start: 2024-03-06 | End: 2024-03-06

## 2024-03-06 RX ORDER — ONDANSETRON 8 MG/1
1 TABLET, FILM COATED ORAL
Qty: 10 | Refills: 0
Start: 2024-03-06 | End: 2024-03-12

## 2024-03-06 RX ORDER — ACETAMINOPHEN 500 MG
2 TABLET ORAL
Qty: 0 | Refills: 0 | DISCHARGE
Start: 2024-03-06

## 2024-03-06 RX ORDER — OXYCODONE HYDROCHLORIDE 5 MG/1
1 TABLET ORAL
Qty: 10 | Refills: 0
Start: 2024-03-06

## 2024-03-06 RX ADMIN — Medication 1000 MILLIGRAM(S): at 07:15

## 2024-03-06 RX ADMIN — Medication 1000 MILLIGRAM(S): at 12:16

## 2024-03-06 RX ADMIN — HYDROMORPHONE HYDROCHLORIDE 0.25 MILLIGRAM(S): 2 INJECTION INTRAMUSCULAR; INTRAVENOUS; SUBCUTANEOUS at 03:38

## 2024-03-06 RX ADMIN — ESCITALOPRAM OXALATE 40 MILLIGRAM(S): 10 TABLET, FILM COATED ORAL at 11:14

## 2024-03-06 RX ADMIN — LISDEXAMFETAMINE DIMESYLATE 60 MILLIGRAM(S): 70 CAPSULE ORAL at 08:25

## 2024-03-06 RX ADMIN — Medication 15 MILLIGRAM(S): at 14:03

## 2024-03-06 RX ADMIN — Medication 1000 MILLIGRAM(S): at 11:16

## 2024-03-06 RX ADMIN — OXYCODONE HYDROCHLORIDE 5 MILLIGRAM(S): 5 TABLET ORAL at 09:39

## 2024-03-06 RX ADMIN — OXYCODONE HYDROCHLORIDE 5 MILLIGRAM(S): 5 TABLET ORAL at 02:34

## 2024-03-06 RX ADMIN — Medication 15 MILLIGRAM(S): at 06:41

## 2024-03-06 RX ADMIN — Medication 10 MILLIGRAM(S): at 08:24

## 2024-03-06 RX ADMIN — HYDROMORPHONE HYDROCHLORIDE 0.25 MILLIGRAM(S): 2 INJECTION INTRAMUSCULAR; INTRAVENOUS; SUBCUTANEOUS at 04:31

## 2024-03-06 RX ADMIN — PIPERACILLIN AND TAZOBACTAM 25 GRAM(S): 4; .5 INJECTION, POWDER, LYOPHILIZED, FOR SOLUTION INTRAVENOUS at 02:35

## 2024-03-06 RX ADMIN — Medication 15 MILLIGRAM(S): at 15:15

## 2024-03-06 RX ADMIN — Medication 15 MILLIGRAM(S): at 07:15

## 2024-03-06 RX ADMIN — OXYCODONE HYDROCHLORIDE 5 MILLIGRAM(S): 5 TABLET ORAL at 10:39

## 2024-03-06 RX ADMIN — OXYCODONE HYDROCHLORIDE 5 MILLIGRAM(S): 5 TABLET ORAL at 14:08

## 2024-03-06 RX ADMIN — OXYCODONE HYDROCHLORIDE 5 MILLIGRAM(S): 5 TABLET ORAL at 13:08

## 2024-03-06 RX ADMIN — ENOXAPARIN SODIUM 40 MILLIGRAM(S): 100 INJECTION SUBCUTANEOUS at 11:13

## 2024-03-06 RX ADMIN — Medication 1000 MILLIGRAM(S): at 06:42

## 2024-03-06 RX ADMIN — OXYCODONE HYDROCHLORIDE 5 MILLIGRAM(S): 5 TABLET ORAL at 03:30

## 2024-03-06 RX ADMIN — PIPERACILLIN AND TAZOBACTAM 25 GRAM(S): 4; .5 INJECTION, POWDER, LYOPHILIZED, FOR SOLUTION INTRAVENOUS at 10:28

## 2024-03-06 NOTE — DISCHARGE NOTE NURSING/CASE MANAGEMENT/SOCIAL WORK - PATIENT PORTAL LINK FT
You can access the FollowMyHealth Patient Portal offered by [facility] by registering at the following website: [email or web address]. By joining [facility]’s FollowMyHealth portal, you will also be able to view your health information using other applications (apps) compatible with our system.

## 2024-03-06 NOTE — DISCHARGE NOTE PROVIDER - NSDCCPCAREPLAN_GEN_ALL_CORE_FT
PRINCIPAL DISCHARGE DIAGNOSIS  Diagnosis: Acute appendicitis  Assessment and Plan of Treatment: You had a laparoscopic appendectomy on 3/03/24.  Please follow up with Dr. Lara in 1-2 weeks     PRINCIPAL DISCHARGE DIAGNOSIS  Diagnosis: Acute appendicitis  Assessment and Plan of Treatment: You had a laparoscopic appendectomy on 3/03/24.  You were treated with intravenous antibiotics. You will continue with oral antibiotics (Augmentin) two times a day for 1 week.  Take your medicine with a glass of water or food as told by your doctor.  Take the medicine as told. Finish them even if you start to feel better.  Do not give your medicine to other people.  Do not use your medicine in the future for a different infection.  Ask your doctor about which side effects to watch for.  Try not to miss any doses. If you miss a dose, take it as soon as possible  Please follow up with Dr. Lara in 1-2 weeks

## 2024-03-06 NOTE — DISCHARGE NOTE PROVIDER - CARE PROVIDER_API CALL
Michelle Lara  Colon/Rectal Surgery  [address]  [place] [address]  Phone: [phone]  Fax: [phone]  Follow Up Time: 1 week

## 2024-03-06 NOTE — DISCHARGE NOTE PROVIDER - NSDCMRMEDTOKEN_GEN_ALL_CORE_FT
Lexapro 20 mg oral tablet: 2 tab(s) orally once a day  Vyvanse 60 mg oral capsule: 1 cap(s) orally once a day   acetaminophen 500 mg oral tablet: 2 tab(s) orally every 6 hours  Lexapro 20 mg oral tablet: 2 tab(s) orally once a day  ondansetron 4 mg oral tablet: 1 tab(s) orally every 8 hours as needed for  nausea MDD: 3 tablets  oxyCODONE 5 mg oral tablet: 1 tab(s) orally every 6 hours as needed for  severe pain MDD: 4 tablets  Vyvanse 60 mg oral capsule: 1 cap(s) orally once a day   acetaminophen 500 mg oral tablet: 2 tab(s) orally every 6 hours  amoxicillin-clavulanate 875 mg-125 mg oral tablet: 1 tab(s) orally 2 times a day MDD: 2 tablets  Lexapro 20 mg oral tablet: 2 tab(s) orally once a day  ondansetron 4 mg oral tablet: 1 tab(s) orally every 8 hours as needed for  nausea MDD: 3 tablets  oxyCODONE 5 mg oral tablet: 1 tab(s) orally every 6 hours as needed for  severe pain MDD: 4 tablets  Vyvanse 60 mg oral capsule: 1 cap(s) orally once a day

## 2024-03-06 NOTE — PROGRESS NOTE ADULT - SUBJECTIVE AND OBJECTIVE BOX
DATE OF SERVICE: 03-06-24 @ 16:02    Patient is a 23y old  Female who presents with a chief complaint of Appendicitis (03 Mar 2024 10:39)      INTERVAL HISTORY: doing ok     REVIEW OF SYSTEMS:  CONSTITUTIONAL: No weakness  EYES/ENT: No visual changes;  No throat pain   NECK: No pain or stiffness  RESPIRATORY: No cough, wheezing; No shortness of breath  CARDIOVASCULAR: No chest pain or palpitations  GASTROINTESTINAL: No abdominal  pain. No nausea, vomiting, or hematemesis  GENITOURINARY: No dysuria, frequency or hematuria  NEUROLOGICAL: No stroke like symptoms  SKIN: No rashes    	  MEDICATIONS:        PHYSICAL EXAM:  T(C): 36.8 (03-06-24 @ 15:57), Max: 37.1 (03-05-24 @ 18:46)  HR: 75 (03-06-24 @ 15:57) (63 - 78)  BP: 123/72 (03-06-24 @ 15:57) (106/70 - 125/81)  RR: 18 (03-06-24 @ 15:57) (18 - 18)  SpO2: 100% (03-06-24 @ 15:57) (97% - 100%)  Wt(kg): --  I&O's Summary    05 Mar 2024 07:01  -  06 Mar 2024 07:00  --------------------------------------------------------  IN: 1285 mL / OUT: 2150 mL / NET: -865 mL    06 Mar 2024 07:01  -  06 Mar 2024 16:02  --------------------------------------------------------  IN: 480 mL / OUT: 680 mL / NET: -200 mL          Appearance: In no distress	  HEENT:    PERRL, EOMI	  Cardiovascular:  S1 S2, No JVD  Respiratory: Lungs clear to auscultation	  Gastrointestinal:  Soft, Non-tender, + BS	  Vascularature:  No edema of LE  Psychiatric: Appropriate affect   Neuro: no acute focal deficits                               9.1    7.77  )-----------( 261      ( 06 Mar 2024 07:26 )             28.6     03-06    137  |  101  |  9   ----------------------------<  96  4.3   |  29  |  0.59    Ca    9.0      06 Mar 2024 07:27  Phos  3.0     03-06  Mg     1.9     03-06          Labs personally reviewed      ASSESSMENT/PLAN: 	  Ms. Ley is a 23 year old woman with a PMH of multiple R hip orthopedic surgeries who presents with 1 day of RLQ pain, nausea, and emesis. She reports that last night she became nauseated and numerous episodes of nonbloody nonbilious emesis throughout the night into today. She also noted RLQ pain that was unrelenting but could not necessarily describe if it was crampy or sharp. She did note some subjective chills without measuring fever    1. Cardiac risk stratification   - Patient with good exercise tolerance  - denies cp , sob or palpitations at this time  - patient with no cardiac history   - ECG NSR  - Patient at mild risk for mild-mod risk LAP APPY. Now s/p successful procedure    2. Blood pressure   -stable     3. DVT prophylactic  - c/w Lovenox                  LANDON Swenson DO [facility]  Cardiovascular Medicine  [address] [place] [address], [address]  Available through call or text on [employer]  Office: [phone]

## 2024-03-06 NOTE — PROGRESS NOTE ADULT - ASSESSMENT
23F s/p lap appendectomy (3/3) secondary to perforated appendicitis- Hemodynamically stable, tolerating PO with mild improvement post-operatively.      Plan:  - Optimize pain regimen, non narcotics ATC and oxycodone PRN  - regular diet  - IV abx: zosyn  - DVT ppx: lovenox  - Continue home meds  - Monitor AMANDEEP drain.  - OOB/Ambulate  - trial of void    Green surgery, [id number] 23F s/p lap appendectomy (3/3) secondary to perforated appendicitis- Hemodynamically stable, tolerating PO with mild improvement post-operatively.      Plan:  - d/c hanks 3/6 AM; TOV @2PM  - Optimize pain regimen, non narcotics ATC and oxycodone PRN  - regular diet  - IV abx: zosyn  - DVT ppx: lovenox  - Continue home meds  - Monitor AMANDEEP drain. Plan to go home w/ drain  - Plan to transition to PO Augmentin x 1-week outpatient  - OOB/Ambulate  - trial of void    Green surgery, [id number]

## 2024-03-06 NOTE — DISCHARGE NOTE PROVIDER - NSDCFUSCHEDAPPT_GEN_ALL_CORE_FT
Michelle Lara  [facility]  [facility] [address] [facility]  Scheduled Appointment: 03/11/2024

## 2024-03-06 NOTE — PROGRESS NOTE ADULT - SUBJECTIVE AND OBJECTIVE BOX
Surgery Progress Note     24hour Events:   - advanced to regular diet    Subjective:  Patient seen and examined.   Vital Signs:  Vital Signs Last 24 Hrs  T(C): 36.6 (06 Mar 2024 02:20), Max: 37.1 (05 Mar 2024 13:05)  T(F): 97.9 (06 Mar 2024 02:20), Max: 98.7 (05 Mar 2024 13:05)  HR: 70 (06 Mar 2024 02:20) (70 - 82)  BP: 111/67 (06 Mar 2024 02:20) (106/68 - 123/76)  BP(mean): --  RR: 18 (06 Mar 2024 02:20) (18 - 18)  SpO2: 98% (06 Mar 2024 02:20) (95% - 100%)    Parameters below as of 06 Mar 2024 02:20  Patient On (Oxygen Delivery Method): room air    Physical Exam:  General: NAD, resting comfortably in bed  HEENT: Normocephalic atraumatic  Respiratory: Nonlabored respirations  Cardio: pulse present  Abdomen: soft, appropriately tender, minimally distended, incision c/d/i. AMANDEEP w/ SS output.     Labs:    03-05    138  |  100  |  11  ----------------------------<  108<H>  3.8   |  27  |  0.56    Ca    9.4      05 Mar 2024 07:15  Phos  2.5     03-05  Mg     1.9     03-05                              10.1   9.87  )-----------( 251      ( 05 Mar 2024 07:10 )             31.3         A/P:   Surgery Progress Note     24hour Events:   - advanced to regular diet  - d/c latonya 3/6 AM;     Subjective:  Patient seen and examined.   Vital Signs:  Vital Signs Last 24 Hrs  T(C): 36.6 (06 Mar 2024 02:20), Max: 37.1 (05 Mar 2024 13:05)  T(F): 97.9 (06 Mar 2024 02:20), Max: 98.7 (05 Mar 2024 13:05)  HR: 70 (06 Mar 2024 02:20) (70 - 82)  BP: 111/67 (06 Mar 2024 02:20) (106/68 - 123/76)  BP(mean): --  RR: 18 (06 Mar 2024 02:20) (18 - 18)  SpO2: 98% (06 Mar 2024 02:20) (95% - 100%)    Parameters below as of 06 Mar 2024 02:20  Patient On (Oxygen Delivery Method): room air    Physical Exam:  General: NAD, resting comfortably in bed  HEENT: Normocephalic atraumatic  Respiratory: Nonlabored respirations  Cardio: pulse present  Abdomen: soft, appropriately tender, minimally distended, incision c/d/i. AMANDEEP w/ SS output.     Labs:    03-05    138  |  100  |  11  ----------------------------<  108<H>  3.8   |  27  |  0.56    Ca    9.4      05 Mar 2024 07:15  Phos  2.5     03-05  Mg     1.9     03-05                              10.1   9.87  )-----------( 251      ( 05 Mar 2024 07:10 )             31.3         A/P:

## 2024-03-06 NOTE — DISCHARGE NOTE NURSING/CASE MANAGEMENT/SOCIAL WORK - NSDCPEFALRISK_GEN_ALL_CORE
For information on Fall & Injury Prevention, visit: [email or web address] OR  [email or web address] OR  https://www.cdc.gov/steadi/patient.html

## 2024-03-06 NOTE — PROGRESS NOTE ADULT - ATTENDING COMMENTS
No issues overnight.  + flatus, no BM yet.  Abdomen soft, NT, ND.  Drain serous.  Stable.  D/c Wright.  Dulcolax supp this morning.  Check drain creatinine.  Continue to ambulate.  May shower.
Pain is less. Tolerating po. + flatus.  Abdomen soft, with R lateral tenderness.  Drain serous.  WBC nl and H/H equilibrating.  Stable.  Stop IVFs.  Plan for TOV tomorrow morning.  Continue IV ABX.
Feels abd tightness.  No V/V.  Abdomen soft, with mild incisional tenderness (appropriate), ND.  Incisions c/d/i.  Drain serous.  WBC 12.7.  Hgb 10 from 12, likely equilibrating.  Stable overall.  Admitted with sepsis d/t perforated gangrenous appendicitis.  IVF bolus for borderline tachycardia.  Trend labs.  Toradol x1 for pain.  Continue IV ABX.   D/w pt and mother at bedside, father and PMD on the phone.
I saw and examined the pt at 1:05 pm.  Incisional pain somewhat better, continues with R lateral pain.  + nausea.  + flatus.  Abd soft, flat, with mild R lateral tenderness.  Drain serosanguinous.  Urine dark - Wright in place for urinary retention.  WBC 9. Hgb 9, admission Hgb likely elevated d/t hemoconcentration.  Stable overall.  Nausea today likely d/t the oxy.  Avoid oxy today, pain control with Tylenol, Toradol and IV Dilaudid.  Will give one bolus of IVFs.   Continue ABX, continue clears.   D/w pt and family at the bedside.

## 2024-03-06 NOTE — DISCHARGE NOTE PROVIDER - HOSPITAL COURSE
23 year old woman with a PMH of multiple R hip orthopedic surgeries who presents with 1 day of RLQ pain, nausea, and emesis. She reports that last night she became nauseated and numerous episodes of nonbloody nonbilious emesis throughout the night into today. She also noted RLQ pain that was unrelenting but could not necessarily describe if it was crampy or sharp. She did note some subjective chills wihtout measuring fever. She did have her regular period starting around 2 days ago, associated with normal amount of flow. She is regular and typically lasts about 6 days. she denies being sexually active in the past few weeks and denies any history of STI or recent infections of any kind. In the ED the patient is afebrile, mildly hypotensive, and normocardic. Labs were significant for a leukocytosis to 21 and CT demonstrates acute perforated appendicitis.  (02 Mar 2024 23:28)    Pt was admitted under General (Green) Surgery for further evaluation and management. Pt was taken to the OR on 3/03/24, and is s/p laparoscopic cholecystectomy. The patient tolerated the procedure well (see operative report for full details). Pt was transferred to the PACU in stable condition. In the PACU, the patient's pain was controlled and vital signs within normal limits. The patient was encouraged with early ambulation. On POC, the patient was doing well. The patient was transferred to the surgical floor in stable condition. On POD #1, pt was stable and doing well. Pt's Wright was discontinued on 3/06, and passed TOV. Once IV pain control dosing complete, pt was transitioned to oral Tylenol and Motrin with Oxycodone for breakthrough pain. Diet was advanced as tolerated, and GI function returned. Ambulation and incentive spirometry encouraged.   Labs were monitored daily, and electrolytes were repleted as necessary.      Urine dark - Wright in place for urinary retention.  WBC 9. Hgb 9, admission Hgb likely elevated d/t hemoconcentration.  Stable overall.  Nausea today likely d/t the oxy.  Avoid oxy today, pain control with Tylenol, Toradol and IV Dilaudid.  Will give one bolus of IVFs.   Continue ABX, continue clears.   D/w pt and family at the bedside.       Cardiology was consulted for cardiac co-management; recommendations were followed    On the day of discharge, the patient's vitals are within normal limits, pain is controlled, voiding urine, tolerating a PO diet, and ambulating well. Pt will f/u with Dr. Lara in 1-2 weeks. Pt will f/u with PCP in 1-2 weeks. 23 year old woman with a PMH of multiple R hip orthopedic surgeries who presents with 1 day of RLQ pain, nausea, and emesis. She reports that last night she became nauseated and numerous episodes of nonbloody nonbilious emesis throughout the night into today. She also noted RLQ pain that was unrelenting but could not necessarily describe if it was crampy or sharp. She did note some subjective chills wihtout measuring fever. She did have her regular period starting around 2 days ago, associated with normal amount of flow. She is regular and typically lasts about 6 days. she denies being sexually active in the past few weeks and denies any history of STI or recent infections of any kind. In the ED the patient is afebrile, mildly hypotensive, and normocardic. Labs were significant for a leukocytosis to 21 and CT demonstrates acute perforated appendicitis.  (02 Mar 2024 23:28)    Pt was admitted under General (Green) Surgery for further evaluation and management. Pt was taken to the OR on 3/03/24, and is s/p laparoscopic appendectomy. The patient tolerated the procedure well (see operative report for full details). Pt was transferred to the PACU in stable condition. In the PACU, the patient's pain was controlled and vital signs within normal limits. The patient was encouraged with early ambulation. On POC, the patient was doing well. The patient was transferred to the surgical floor in stable condition. On POD #1, pt was stable and doing well. Pt's Wright was discontinued on 3/06, and passed TOV. Once IV pain control dosing complete, pt was transitioned to oral Tylenol and Motrin with Oxycodone for breakthrough pain. Diet was advanced as tolerated, and GI function returned. Ambulation and incentive spirometry encouraged.   Labs were monitored daily, and electrolytes were repleted as necessary.      Urine dark - Wright in place for urinary retention.  WBC 9. Hgb 9, admission Hgb likely elevated d/t hemoconcentration.  Stable overall.  Nausea today likely d/t the oxy.  Avoid oxy today, pain control with Tylenol, Toradol and IV Dilaudid.  Will give one bolus of IVFs.   Continue ABX, continue clears.   D/w pt and family at the bedside.       Cardiology was consulted for cardiac co-management; recommendations were followed    On the day of discharge, the patient's vitals are within normal limits, pain is controlled, voiding urine, tolerating a PO diet, and ambulating well. Pt will f/u with Dr. Lara in 1-2 weeks. Pt will f/u with PCP in 1-2 weeks. 23 year old woman with a PMH of multiple R hip orthopedic surgeries who presents with 1 day of RLQ pain, nausea, and emesis. She reports that last night she became nauseated and numerous episodes of nonbloody nonbilious emesis throughout the night into today. She also noted RLQ pain that was unrelenting but could not necessarily describe if it was crampy or sharp. She did note some subjective chills wihtout measuring fever. She did have her regular period starting around 2 days ago, associated with normal amount of flow. She is regular and typically lasts about 6 days. she denies being sexually active in the past few weeks and denies any history of STI or recent infections of any kind. In the ED the patient is afebrile, mildly hypotensive, and normocardic. Labs were significant for a leukocytosis to 21 and CT demonstrates acute perforated appendicitis. (02 Mar 2024 23:28)    Pt was admitted under General (Green) Surgery for further evaluation and management. Pt was taken to the OR on 3/03/24, and is s/p laparoscopic appendectomy. The patient tolerated the procedure well (see operative report for full details). Wright was removed at the end of case. Pt was transferred to the PACU in stable condition. In the PACU, the patient's pain was controlled and vital signs within normal limits. The patient was encouraged with early ambulation. On POC, the patient was doing well. The patient was transferred to the surgical floor in stable condition. On POD #1, pt was stable and doing well. Once IV pain control dosing complete, pt was transitioned to oral Tylenol and Motrin with Oxycodone for breakthrough pain. Pt failed TOV x3, and Wright was re-inserted on 3/04. Pt's Wright was discontinued on 3/06, and pt passed TOV. Diet was advanced as tolerated, and GI function returned. Ambulation and incentive spirometry encouraged.   Labs were monitored daily, and electrolytes were repleted as necessary.      Cardiology was consulted for cardiac co-management; recommendations were followed    On the day of discharge, the patient's vitals are within normal limits, pain is controlled, voiding urine, passing gas/stool, tolerating a PO diet, and ambulating well. Pt will f/u with Dr. Lara in 1-2 weeks. Pt will f/u with PCP in 1-2 weeks.

## 2024-03-07 PROBLEM — Z00.00 ENCOUNTER FOR PREVENTIVE HEALTH EXAMINATION: Status: ACTIVE | Noted: 2024-03-07

## 2024-03-08 DIAGNOSIS — Z78.9 OTHER SPECIFIED HEALTH STATUS: ICD-10-CM

## 2024-03-08 RX ORDER — LISDEXAMFETAMINE DIMESYLATE 60 MG/1
60 CAPSULE ORAL
Refills: 0 | Status: ACTIVE | COMMUNITY

## 2024-03-08 RX ORDER — ESCITALOPRAM OXALATE 5 MG/1
TABLET, FILM COATED ORAL
Refills: 0 | Status: ACTIVE | COMMUNITY

## 2024-03-11 ENCOUNTER — APPOINTMENT (OUTPATIENT)
Dept: SURGERY | Facility: CLINIC | Age: 24
End: 2024-03-11
Payer: COMMERCIAL

## 2024-03-11 VITALS
HEART RATE: 102 BPM | OXYGEN SATURATION: 99 % | SYSTOLIC BLOOD PRESSURE: 109 MMHG | BODY MASS INDEX: 23.21 KG/M2 | DIASTOLIC BLOOD PRESSURE: 71 MMHG | HEIGHT: 63 IN | WEIGHT: 131 LBS

## 2024-03-11 PROCEDURE — 99024 POSTOP FOLLOW-UP VISIT: CPT

## 2024-03-12 NOTE — HISTORY OF PRESENT ILLNESS
[de-identified] : Mary is a 24 y/o female here for a post-op visit, s/p laparoscopic appendectomy on 3/3/24.  Pathology - Perforated appendix, appendectomy: - Acute perforated appendicitis with marked fibrinous periappendicitis and luminal dilation with intraluminal fecal material  Today pt reports pain 5/10 Pain managed with Tylenol / Motrin and oxycodone some nights.  Noticed a small amount of bleeding from the supraumbilical port incision today.  They surgical drain output was 60 cc on 3/9, 40 cc on 3/10 and when we amputated in the office it was approximately 40 cc.  She has had persistent nausea, no vomiting.  Has flatus and small BMs.  Denies fever and chills. Normal BM, denies constipation or diarrhea. Fair appetite. Not taking anticoagulants.

## 2024-03-12 NOTE — CONSULT LETTER
[Dear  ___] : Dear  [unfilled], [Courtesy Letter:] : I had the pleasure of seeing your patient, [unfilled], in my office today. [Please see my note below.] : Please see my note below. [Sincerely,] : Sincerely, [FreeTextEntry2] : Dr. Asif Freedman [FreeTextEntry3] : Michelle Lara M.D., F.A.C.S., F.A.S.C.R.S. Assistant Professor of Surgery [employer] kenan [employer]ultz [facility] [employer] [facility]

## 2024-03-12 NOTE — ASSESSMENT
[FreeTextEntry1] : Comfortable. Abdomen soft, non-tender, non-distended. Port incisions healing well.  There is a minor raw area approximately 1 mm at the supraumbilical port incision which is the source of the minor bleeding.  No active bleeding.  Old Steri-Strips removed and new Steri-Strips placed.  No hernias. The drain has serous output.  I had a long discussion with the patient and her mother and a family friend who is a physician on the phone about merits and risks of by removing the drain today.  The drain is painful and there is skin irritation at the skin edges around it, no infection.   My assessment was that it would be best to remove the drain today.  The output has decreased significantly compared to last week and the small amount that is draining now should be absorbed by the peritoneal cavity.  I am concerned that the skin irritation will get worse if I leave the drain in place for even few more days.  I discussed that I cannot rule out the small possibility that she will not develop a fluid collection requiring IR drainage.  All were in agreement and the drain was removed without problems. Doing well postop. Path discussed.  Instructions for diet, activity, wound care given, all questions answered. Will send prescription for Zofran for as needed nausea, the patient is on her last day of Augmentin today, I expect that the nausea is likely related to the antibiotics and should resolve in the next few days. RTO in 3 weeks.

## 2024-03-12 NOTE — REASON FOR VISIT
[Post Op: _________] : a [unfilled] post op visit [Parent] : parent [FreeTextEntry1] : s/p laparoscopic appendectomy on 3/3/24.

## 2024-03-14 RX ORDER — LISDEXAMFETAMINE DIMESYLATE 70 MG/1
1 CAPSULE ORAL
Refills: 0 | DISCHARGE

## 2024-03-14 RX ORDER — ESCITALOPRAM OXALATE 10 MG/1
2 TABLET, FILM COATED ORAL
Refills: 0 | DISCHARGE

## 2024-03-15 ENCOUNTER — TRANSCRIPTION ENCOUNTER (OUTPATIENT)
Age: 24
End: 2024-03-15

## 2024-03-19 ENCOUNTER — OUTPATIENT (OUTPATIENT)
Dept: OUTPATIENT SERVICES | Facility: HOSPITAL | Age: 24
LOS: 1 days | End: 2024-03-19
Payer: COMMERCIAL

## 2024-03-19 ENCOUNTER — RESULT REVIEW (OUTPATIENT)
Age: 24
End: 2024-03-19

## 2024-03-19 ENCOUNTER — APPOINTMENT (OUTPATIENT)
Dept: CT IMAGING | Facility: CLINIC | Age: 24
End: 2024-03-19
Payer: COMMERCIAL

## 2024-03-19 DIAGNOSIS — Z09 ENCOUNTER FOR FOLLOW-UP EXAMINATION AFTER COMPLETED TREATMENT FOR CONDITIONS OTHER THAN MALIGNANT NEOPLASM: ICD-10-CM

## 2024-03-19 PROBLEM — F41.9 ANXIETY DISORDER, UNSPECIFIED: Chronic | Status: ACTIVE | Noted: 2024-03-02

## 2024-03-19 PROCEDURE — 74177 CT ABD & PELVIS W/CONTRAST: CPT

## 2024-03-19 PROCEDURE — 74177 CT ABD & PELVIS W/CONTRAST: CPT | Mod: 26

## 2024-03-20 ENCOUNTER — APPOINTMENT (OUTPATIENT)
Dept: SURGERY | Facility: CLINIC | Age: 24
End: 2024-03-20
Payer: COMMERCIAL

## 2024-03-20 VITALS
HEIGHT: 63 IN | TEMPERATURE: 97.4 F | WEIGHT: 131 LBS | SYSTOLIC BLOOD PRESSURE: 111 MMHG | RESPIRATION RATE: 16 BRPM | HEART RATE: 79 BPM | BODY MASS INDEX: 23.21 KG/M2 | OXYGEN SATURATION: 99 % | DIASTOLIC BLOOD PRESSURE: 67 MMHG

## 2024-03-20 DIAGNOSIS — K35.32 ACUTE APPENDICITIS W/ PERFORATION AND LOCALIZED PERITONITIS, W/O ABSCESS: ICD-10-CM

## 2024-03-20 PROCEDURE — 99024 POSTOP FOLLOW-UP VISIT: CPT

## 2024-03-20 RX ORDER — AMOXICILLIN AND CLAVULANATE POTASSIUM 875; 125 MG/1; MG/1
875-125 TABLET, COATED ORAL
Qty: 10 | Refills: 0 | Status: DISCONTINUED | COMMUNITY
Start: 2024-03-18 | End: 2024-03-20

## 2024-03-26 ENCOUNTER — OUTPATIENT (OUTPATIENT)
Dept: OUTPATIENT SERVICES | Facility: HOSPITAL | Age: 24
LOS: 1 days | End: 2024-03-26
Payer: COMMERCIAL

## 2024-03-26 ENCOUNTER — APPOINTMENT (OUTPATIENT)
Dept: CT IMAGING | Facility: CLINIC | Age: 24
End: 2024-03-26
Payer: COMMERCIAL

## 2024-03-26 ENCOUNTER — NON-APPOINTMENT (OUTPATIENT)
Age: 24
End: 2024-03-26

## 2024-03-26 DIAGNOSIS — Z09 ENCOUNTER FOR FOLLOW-UP EXAMINATION AFTER COMPLETED TREATMENT FOR CONDITIONS OTHER THAN MALIGNANT NEOPLASM: ICD-10-CM

## 2024-03-26 LAB
APPEARANCE: CLEAR
BILIRUBIN URINE: NEGATIVE
BLOOD URINE: NEGATIVE
COLOR: YELLOW
GLUCOSE QUALITATIVE U: NEGATIVE MG/DL
KETONES URINE: NEGATIVE MG/DL
LEUKOCYTE ESTERASE URINE: NEGATIVE
NITRITE URINE: NEGATIVE
PH URINE: 7.5
PROTEIN URINE: NEGATIVE MG/DL
SPECIFIC GRAVITY URINE: >1.03
UROBILINOGEN URINE: 0.2 MG/DL

## 2024-03-26 PROCEDURE — 74177 CT ABD & PELVIS W/CONTRAST: CPT

## 2024-03-26 PROCEDURE — 74177 CT ABD & PELVIS W/CONTRAST: CPT | Mod: 26

## 2024-03-27 ENCOUNTER — NON-APPOINTMENT (OUTPATIENT)
Age: 24
End: 2024-03-27

## 2024-03-31 PROBLEM — K35.32 PERFORATED APPENDICITIS: Status: RESOLVED | Noted: 2024-03-31 | Resolved: 2024-03-31

## 2024-03-31 NOTE — PHYSICAL EXAM
[de-identified] : Comfortable.  Abdomen soft, non-tender, non-distended. Port incisions healing well.  The supraumbilical port incision has an early scab. There is no underlying fluid, no fluctuance, erythema or induration.

## 2024-03-31 NOTE — REASON FOR VISIT
[Parent] : parent [FreeTextEntry1] : s/p laparoscopic appendectomy on 3/3/24.  [Post Op: _________] : a [unfilled] post op visit

## 2024-03-31 NOTE — ASSESSMENT
[FreeTextEntry1] : Overall w/o major issues. Minor discharge from the port incision resolving, no intervention needed for the incision. Complete the ABX. The intermittent nausea is likely d/t the ABX - f/u for resolution once off the ABX. RTO in 2 weeks.

## 2024-03-31 NOTE — HISTORY OF PRESENT ILLNESS
[de-identified] : Mary is a 24 y/o female here for a post-op visit, s/p laparoscopic appendectomy on 3/3/24.  Pathology - 1. Perforated appendix, appendectomy: - Acute perforated appendicitis with marked fibrinous periappendicitis and luminal dilation with intraluminal fecal material - One (1) reactive lymph node  CT A+P on 3/19/24 - Small subcutaneous umbilical fluid collection, may represent postoperative abscess. Bilateral striated nephrogram may represent polynephritis. Correlation with urinalysis is suggested.  Today pt reports mild discomfort. Has drainage (yellow) from umbilical incision for the last few days, it is getting less. She is currently on ABX for the wound drainage. Has intermittent nausea but no vomiting - taking ondansetron with relief. Denies fever and chills. Normal BM, denies constipation or diarrhea - taking Colace and magnesium. Good appetite. Not taking anticoagulants.

## 2024-04-03 ENCOUNTER — APPOINTMENT (OUTPATIENT)
Dept: SURGERY | Facility: CLINIC | Age: 24
End: 2024-04-03
Payer: COMMERCIAL

## 2024-04-03 VITALS
DIASTOLIC BLOOD PRESSURE: 81 MMHG | OXYGEN SATURATION: 96 % | HEART RATE: 76 BPM | SYSTOLIC BLOOD PRESSURE: 127 MMHG | TEMPERATURE: 96.9 F | RESPIRATION RATE: 17 BRPM

## 2024-04-03 DIAGNOSIS — Z90.49 ACQUIRED ABSENCE OF OTHER SPECIFIED PARTS OF DIGESTIVE TRACT: ICD-10-CM

## 2024-04-03 DIAGNOSIS — Z09 ENCOUNTER FOR FOLLOW-UP EXAMINATION AFTER COMPLETED TREATMENT FOR CONDITIONS OTHER THAN MALIGNANT NEOPLASM: ICD-10-CM

## 2024-04-03 PROCEDURE — 99024 POSTOP FOLLOW-UP VISIT: CPT

## 2024-04-03 RX ORDER — ONDANSETRON 4 MG/1
4 TABLET ORAL
Qty: 5 | Refills: 0 | Status: DISCONTINUED | COMMUNITY
Start: 2024-03-12 | End: 2024-04-03

## 2024-04-03 RX ORDER — AMOXICILLIN 500 MG/1
CAPSULE ORAL
Refills: 0 | Status: DISCONTINUED | COMMUNITY
End: 2024-04-03

## 2024-04-03 RX ORDER — PANTOPRAZOLE 20 MG/1
20 TABLET, DELAYED RELEASE ORAL DAILY
Qty: 15 | Refills: 0 | Status: ACTIVE | COMMUNITY
Start: 2024-04-03 | End: 1900-01-01

## 2024-04-04 ENCOUNTER — APPOINTMENT (OUTPATIENT)
Dept: GASTROENTEROLOGY | Facility: CLINIC | Age: 24
End: 2024-04-04

## 2024-04-04 PROBLEM — Z09 POSTOPERATIVE EXAMINATION: Status: ACTIVE | Noted: 2024-03-07

## 2024-04-04 RX ORDER — GEL BASE NO.41
GEL (GRAM) MISCELLANEOUS
Qty: 1 | Refills: 1 | Status: ACTIVE | COMMUNITY
Start: 2024-04-04 | End: 1900-01-01

## 2024-04-04 NOTE — CONSULT LETTER
[Dear  ___] : Dear  [unfilled], [Courtesy Letter:] : I had the pleasure of seeing your patient, [unfilled], in my office today. [Please see my note below.] : Please see my note below. [Sincerely,] : Sincerely, [FreeTextEntry2] : Dr. Asif Freedman [FreeTextEntry3] : Michelle Lara M.D., F.A.C.S., F.A.S.C.R.S. Assistant Professor of Surgery [employer] [facility] [employer] [facility]

## 2024-04-04 NOTE — HISTORY OF PRESENT ILLNESS
[de-identified] : Mary is a 22 y/o female here for a post-op visit, s/p laparoscopic appendectomy on 3/3/24. Pathology - 1. Perforated appendix, appendectomy: - Acute perforated appendicitis with marked fibrinous periappendicitis and luminal dilation with intraluminal fecal material - One (1) reactive lymph node  CT A+P on 3/19/24 - Small subcutaneous umbilical fluid collection, may represent postoperative abscess. Bilateral striated nephrogram may represent polynephritis. Correlation with urinalysis is suggested.  CT A+P on 3/26/24 - Status post appendectomy. No intra-abdominal fluid collections. No subcutaneous or abdominal wall fluid collections.  Last spoken to on 3/28/24 - I reviewed the CT and discussed results with pt and mother. Pt feels better, still with some epigastric pain though, Gas-x makes it better. CT does show fecal load in the colon and the pt has h/o episodes of constipation in the past. Advised for increase of water intake, daily Miralax in addition to her Colace, use gas-X as needed. She has f/u apt with me early next week.  Today pt reports some pain of LUQ - Gas-X didnt help pt with pain. . Denies swelling of surgical incision but does have drainage and some bleeding - keeping gauze in place. Does have nausea only with pain but no vomiting. Had low-grade fever of 99F yesterday but has resolved today. Daily BM, takes miralax, denies constipation or diarrhea. Good appetite.

## 2024-04-04 NOTE — ASSESSMENT
[FreeTextEntry1] : 23-year-old female 4 weeks after laparoscopic appendectomy for perforated gangrenous appendicitis. At the supraumbilical port incision is healing, I excised the overlying scar in the office today.  Will allow the wound to heal with secondary intent.  I advised the patient to wash it daily and simply cover it for the next 2 days, then start applying hydrogel 2-3 times a day. The patient continues to have upper abdominal pain that occurs at least once a day.  Few days ago she had emesis because of the pain.  She experiences it as gas pain.  She has stopped taking NSAIDs for the last few days.  The etiology of the symptoms is unclear.  I discussed with the patient and her mother that I cannot explain these symptoms 4 weeks after laparoscopic appendectomy and with 2 normal CTs.  I advised for GI consultation.  The patient wants to choose a GI and will keep me posted.  I advised her to continue avoiding NSAIDs and I prescribed Protonix.  She will also try a different gas medication instead of Gas-X (Phazyme). RTO in 2 weeks.

## 2024-04-04 NOTE — PHYSICAL EXAM
[de-identified] : Comfortable.  Abdomen soft, non-tender, non-distended. The 5 mm port incisions have healed well.  The supraumbilical port incision has overlying thickened scar and the bottom corner has a drop of fluid. Under local anesthesia I excised the overlying scar. The bed of the small wound (1.5 x 1 cm) was treated with sliver nitrate.

## 2024-04-04 NOTE — REASON FOR VISIT
[Follow-Up: _____] : a [unfilled] follow-up visit [Post Op: _________] : a [unfilled] post op visit [Parent] : parent [FreeTextEntry1] : s/p laparoscopic appendectomy on 3/3/24.

## 2024-04-06 LAB — CORE LAB BIOPSY: NORMAL

## 2024-08-02 NOTE — ED ADULT NURSE NOTE - TEMPLATE
[FreeTextEntry2] : Adjust to life after father's death; and reduce complex trauma and anxiety symptoms  1) Reduce scores on symptom scales by at least 20% 2) Learn at least 2-3 coping skills [Cognitive Processing Therapy] : Cognitive Processing Therapy  [de-identified] :  PROGRESS TO DATE/UPDATES: Decided to accept the bank settlement regarding father's house. Pt feels uncertain of the decision because it was fully for herself and would do nothing to improve similar situations for other people in the future.  Still, Pt proud of self for taking care of herself in multiple domains. Completed 3 Challenging Beliefs Worksheets.  INTERVENTIONS/PLAN: Reviewed recently completed rating scales (CHRIS-7, PHQ-9, and PCL-5), underscoring progress in reducing PTSD symptoms. Helped pt confront a core assimilated belief via Socratic dialogue. Pt will complete an additional 3-4 Challenging Beliefs Worksheets for ongoing home practice. [Recommended Frequency of Visits: ____] : Recommended frequency of visits: [unfilled] [FreeTextEntry1] :  PLAN: Continue CPT.  Continue supportive grief counseling with CBT/DBT skills as appropriate. Continue monitoring risk level. Pt will maintain follow-up with psychiatrist. General

## 2025-01-30 NOTE — PATIENT PROFILE ADULT - FUNCTIONAL ASSESSMENT - DAILY ACTIVITY 6.
Patient is being discharged. Patient remained free from falls, injuries, or accidents during stay. Patient education provided by this nurse and has been given discharge paperwork containing follow up orders and education.  
VSS. NAD. Plan of care discussed with patient. Procedure education provided. All questions and concerns answered. Patient verbalizes understanding.     Dr. Soto made aware that patient took his Aspirin 81mg on Tuesday. MD okay to proceed with CHANEL today.  
4 = No assist / stand by assistance

## (undated) DEVICE — VALVE YELLOW PORT SEAL PLUS 5MM

## (undated) DEVICE — VENODYNE/SCD SLEEVE CALF LARGE

## (undated) DEVICE — GLV 7 PROTEXIS (WHITE)

## (undated) DEVICE — SOL IRR POUR NS 0.9% 500ML

## (undated) DEVICE — GLV 7.5 PROTEXIS (WHITE)

## (undated) DEVICE — ELCTR BOVIE PENCIL SMOKE EVACUATION

## (undated) DEVICE — SUT BIOSYN 4-0 18" P-12

## (undated) DEVICE — MEDICATION LABELS W MARKER

## (undated) DEVICE — APPLICATOR VISTASEAL LAP DUAL 35CM RIGID

## (undated) DEVICE — DRAPE TOWEL BLUE 17" X 24"

## (undated) DEVICE — TUBING STRYKEFLOW II SUCTION / IRRIGATOR

## (undated) DEVICE — DRSG TEGADERM 6"X8"

## (undated) DEVICE — POSITIONER FOAM EGG CRATE ULNAR 2PCS (PINK)

## (undated) DEVICE — FOLEY TRAY 16FR 5CC LTX UMETER CLOSED

## (undated) DEVICE — ENDOCATCH 10MM SPECIMEN POUCH

## (undated) DEVICE — DISSECTOR ENDO PEANUT 5MM

## (undated) DEVICE — ELCTR CORD FOOTSWITCH 1PLR LAPSCP 10FT

## (undated) DEVICE — SHEARS COVIDIEN ENDO SHEAR 5MM X 31CM W UNIPOLAR CAUTERY

## (undated) DEVICE — D HELP - CLEARVIEW CLEARIFY SYSTEM

## (undated) DEVICE — GLV 6.5 PROTEXIS (WHITE)

## (undated) DEVICE — STAPLER COVIDIEN ENDO GIA STANDARD HANDLE

## (undated) DEVICE — SPECIMEN CONTAINER 100ML

## (undated) DEVICE — TROCAR APPLIED MEDICAL KII BALLOON BLUNT TIP 12MM X 100MM

## (undated) DEVICE — TUBING INSUFFLATION LAP FILTER 10FT

## (undated) DEVICE — BLADE SCALPEL SAFETYLOCK #10

## (undated) DEVICE — TROCAR COVIDIEN VERSAPORT BLADELESS OPTICAL 5MM STANDARD

## (undated) DEVICE — DRSG OPSITE 2.5 X 2"

## (undated) DEVICE — SUT POLYSORB 0 30" GS-23

## (undated) DEVICE — PACK ADVANCED LAPAROSCOPIC NS

## (undated) DEVICE — DRSG STERISTRIPS 0.5 X 4"

## (undated) DEVICE — SOL IRR POUR H2O 250ML

## (undated) DEVICE — LIGASURE MARYLAND 37CM

## (undated) DEVICE — PREP CHLORAPREP HI-LITE ORANGE 26ML

## (undated) DEVICE — BLADE SCALPEL SAFETYLOCK #15

## (undated) DEVICE — GOWN TRIMAX LG

## (undated) DEVICE — WARMING BLANKET UPPER ADULT

## (undated) DEVICE — DRAPE INSTRUMENT POUCH 6.75" X 11"

## (undated) DEVICE — TUBING IRRIGATION DAVOL SYSTEM X STREAM